# Patient Record
Sex: FEMALE | Race: WHITE | NOT HISPANIC OR LATINO | Employment: UNEMPLOYED | URBAN - METROPOLITAN AREA
[De-identification: names, ages, dates, MRNs, and addresses within clinical notes are randomized per-mention and may not be internally consistent; named-entity substitution may affect disease eponyms.]

---

## 2017-03-17 ENCOUNTER — TRANSFERRED RECORDS (OUTPATIENT)
Dept: HEALTH INFORMATION MANAGEMENT | Facility: CLINIC | Age: 67
End: 2017-03-17

## 2017-04-13 ENCOUNTER — TELEPHONE (OUTPATIENT)
Dept: GASTROENTEROLOGY | Facility: CLINIC | Age: 67
End: 2017-04-13

## 2017-04-13 NOTE — TELEPHONE ENCOUNTER
Left message for pt reminding them of upcoming appointment.  Instructed pt to bring updated medications list.  Instructed pt to arrive an hour and a half to two hours prior to appt time for labs.  Ishmael Reed, CMA

## 2017-04-25 ENCOUNTER — OFFICE VISIT (OUTPATIENT)
Dept: GASTROENTEROLOGY | Facility: CLINIC | Age: 67
End: 2017-04-25
Attending: INTERNAL MEDICINE
Payer: MEDICARE

## 2017-04-25 VITALS
SYSTOLIC BLOOD PRESSURE: 126 MMHG | OXYGEN SATURATION: 99 % | BODY MASS INDEX: 32.5 KG/M2 | HEART RATE: 79 BPM | HEIGHT: 62 IN | DIASTOLIC BLOOD PRESSURE: 79 MMHG | TEMPERATURE: 97.8 F | WEIGHT: 176.6 LBS

## 2017-04-25 DIAGNOSIS — Z23 ENCOUNTER FOR IMMUNIZATION: ICD-10-CM

## 2017-04-25 DIAGNOSIS — K74.60 CIRRHOSIS OF LIVER WITHOUT ASCITES, UNSPECIFIED HEPATIC CIRRHOSIS TYPE (H): ICD-10-CM

## 2017-04-25 DIAGNOSIS — K74.60 CIRRHOSIS OF LIVER WITHOUT ASCITES, UNSPECIFIED HEPATIC CIRRHOSIS TYPE (H): Primary | ICD-10-CM

## 2017-04-25 LAB
AFP SERPL-MCNC: 4.6 UG/L (ref 0–8)
ALBUMIN SERPL-MCNC: 3.8 G/DL (ref 3.4–5)
ALP SERPL-CCNC: 87 U/L (ref 40–150)
ALT SERPL W P-5'-P-CCNC: 61 U/L (ref 0–50)
ANION GAP SERPL CALCULATED.3IONS-SCNC: 4 MMOL/L (ref 3–14)
AST SERPL W P-5'-P-CCNC: 44 U/L (ref 0–45)
BILIRUB DIRECT SERPL-MCNC: 0.2 MG/DL (ref 0–0.2)
BILIRUB SERPL-MCNC: 0.7 MG/DL (ref 0.2–1.3)
BUN SERPL-MCNC: 13 MG/DL (ref 7–30)
CALCIUM SERPL-MCNC: 8.6 MG/DL (ref 8.5–10.1)
CHLORIDE SERPL-SCNC: 107 MMOL/L (ref 94–109)
CO2 SERPL-SCNC: 30 MMOL/L (ref 20–32)
CREAT SERPL-MCNC: 0.62 MG/DL (ref 0.52–1.04)
ERYTHROCYTE [DISTWIDTH] IN BLOOD BY AUTOMATED COUNT: 13.1 % (ref 10–15)
GFR SERPL CREATININE-BSD FRML MDRD: ABNORMAL ML/MIN/1.7M2
GLUCOSE SERPL-MCNC: 139 MG/DL (ref 70–99)
HCT VFR BLD AUTO: 41 % (ref 35–47)
HGB BLD-MCNC: 13.5 G/DL (ref 11.7–15.7)
INR PPP: 1.13 (ref 0.86–1.14)
MCH RBC QN AUTO: 29.9 PG (ref 26.5–33)
MCHC RBC AUTO-ENTMCNC: 32.9 G/DL (ref 31.5–36.5)
MCV RBC AUTO: 91 FL (ref 78–100)
PLATELET # BLD AUTO: 87 10E9/L (ref 150–450)
POTASSIUM SERPL-SCNC: 3.7 MMOL/L (ref 3.4–5.3)
PROT SERPL-MCNC: 6.9 G/DL (ref 6.8–8.8)
RBC # BLD AUTO: 4.52 10E12/L (ref 3.8–5.2)
SODIUM SERPL-SCNC: 141 MMOL/L (ref 133–144)
WBC # BLD AUTO: 2.9 10E9/L (ref 4–11)

## 2017-04-25 PROCEDURE — 85610 PROTHROMBIN TIME: CPT | Performed by: INTERNAL MEDICINE

## 2017-04-25 PROCEDURE — 80048 BASIC METABOLIC PNL TOTAL CA: CPT | Performed by: INTERNAL MEDICINE

## 2017-04-25 PROCEDURE — 85027 COMPLETE CBC AUTOMATED: CPT | Performed by: INTERNAL MEDICINE

## 2017-04-25 PROCEDURE — 36415 COLL VENOUS BLD VENIPUNCTURE: CPT | Performed by: INTERNAL MEDICINE

## 2017-04-25 PROCEDURE — 82105 ALPHA-FETOPROTEIN SERUM: CPT | Performed by: INTERNAL MEDICINE

## 2017-04-25 PROCEDURE — 80076 HEPATIC FUNCTION PANEL: CPT | Performed by: INTERNAL MEDICINE

## 2017-04-25 PROCEDURE — 99212 OFFICE O/P EST SF 10 MIN: CPT | Mod: ZF

## 2017-04-25 PROCEDURE — 90732 PPSV23 VACC 2 YRS+ SUBQ/IM: CPT | Mod: ZF | Performed by: INTERNAL MEDICINE

## 2017-04-25 PROCEDURE — 25000128 H RX IP 250 OP 636: Mod: ZF | Performed by: INTERNAL MEDICINE

## 2017-04-25 PROCEDURE — G0009 ADMIN PNEUMOCOCCAL VACCINE: HCPCS | Mod: ZF

## 2017-04-25 RX ADMIN — PNEUMOCOCCAL VACCINE POLYVALENT 0.5 ML
25; 25; 25; 25; 25; 25; 25; 25; 25; 25; 25; 25; 25; 25; 25; 25; 25; 25; 25; 25; 25; 25; 25 INJECTION, SOLUTION INTRAMUSCULAR; SUBCUTANEOUS at 12:37

## 2017-04-25 ASSESSMENT — PAIN SCALES - GENERAL: PAINLEVEL: NO PAIN (0)

## 2017-04-25 NOTE — NURSING NOTE
Chief Complaint   Patient presents with     RECHECK     Cirrhosis of Liver without Ascites   Pt roomed, vitals, meds, and allergies reviewed with pt. Pt ready for provider.  Ishmael Reed, CMA

## 2017-04-25 NOTE — LETTER
4/25/2017      RE: Felisa Starks  PO   [place] [address]       I had the pleasure of seeing Felisa Starks for followup in the Liver Clinic at the [facility] on 04/25/2017.  Ms. Starks returns for followup of cirrhosis caused by chronic hepatitis C.  She is a sustained virologic responder to hepatitis C treatment.        She is doing well at this visit.  She denies any abdominal pain, itching or skin rash and has only mild fatigue.  She denies any increased abdominal girth.  She did develop some fairly marked swelling in her left leg.  She has been evaluated for that locally, and it sounds as though she has some lymphedema treatment.  A DVT was ruled out, and she mentioned that there may be a small fracture in 1 of the leg bones.  She is no longer on diuretics.  She did have an upper GI endoscopy that showed grade 2 esophageal varices, although they were not banded.  She denies any fevers or chills.  She does have a cough related to a resolving cold.  She denies any nausea or vomiting and is prone to loose bowel movements.  They do not wake her at night.  They are not associated with any blood.  Her appetite has been good; and unfortunately, she has not lost any weight.       Current Outpatient Prescriptions   Medication     Multiple Vitamins-Minerals (MULTIVITAMIN ADULT PO)     Albuterol Sulfate (VENTOLIN HFA IN)     omeprazole (PRILOSEC) 20 MG capsule     budesonide-formoterol (SYMBICORT) 160-4.5 MCG/ACT inhaler     Current Facility-Administered Medications   Medication     pneumococcal vaccine (PNEUMOVAX 23-brown) injection 0.5 mL     B/P: 126/79, T: 97.8, P: 79, R: Data Unavailable    HEENT exam shows no scleral icterus and no temporal muscle wasting.  Chest is clear.  Abdominal exam shows no increase in girth.  No masses or tenderness to palpation are present.  Liver is 10 cm in span without left lobe enlargement.  No spleen tip is palpable, and extremity exam shows no edema.   Skin exam shows no stigmata of chronic liver disease.  Neurologic exam shows no asterixis.       Recent Results (from the past 168 hour(s))    Hepatic Panel [LAB20]    Collection Time: 04/25/17  5:58 AM   Result Value Ref Range    Bilirubin Direct 0.2 0.0 - 0.2 mg/dL    Bilirubin Total 0.7 0.2 - 1.3 mg/dL    Albumin 3.8 3.4 - 5.0 g/dL    Protein Total 6.9 6.8 - 8.8 g/dL    Alkaline Phosphatase 87 40 - 150 U/L    ALT 61 (H) 0 - 50 U/L    AST 44 0 - 45 U/L   Basic metabolic panel [LAB15]    Collection Time: 04/25/17  5:58 AM   Result Value Ref Range    Sodium 141 133 - 144 mmol/L    Potassium 3.7 3.4 - 5.3 mmol/L    Chloride 107 94 - 109 mmol/L    Carbon Dioxide 30 20 - 32 mmol/L    Anion Gap 4 3 - 14 mmol/L    Glucose 139 (H) 70 - 99 mg/dL    Urea Nitrogen 13 7 - 30 mg/dL    Creatinine 0.62 0.52 - 1.04 mg/dL    GFR Estimate >90  Non  GFR Calc   >60 mL/min/1.7m2    GFR Estimate If Black >90   GFR Calc   >60 mL/min/1.7m2    Calcium 8.6 8.5 - 10.1 mg/dL   CBC with platelets [[id number]]    Collection Time: 04/25/17  5:58 AM   Result Value Ref Range    WBC 2.9 (L) 4.0 - 11.0 10e9/L    RBC Count 4.52 3.8 - 5.2 10e12/L    Hemoglobin 13.5 11.7 - 15.7 g/dL    Hematocrit 41.0 35.0 - 47.0 %    MCV 91 78 - 100 fl    MCH 29.9 26.5 - 33.0 pg    MCHC 32.9 31.5 - 36.5 g/dL    RDW 13.1 10.0 - 15.0 %    Platelet Count 87 (L) 150 - 450 10e9/L   INR [[id number]]    Collection Time: 04/25/17  5:58 AM   Result Value Ref Range    INR 1.13 0.86 - 1.14      I did review her ultrasound which shows a cirrhotic-appearing liver without any mass lesions.  She has no ascites.  She has mild splenomegaly.      My impression is that Ms. Starks has cirrhosis caused by chronic hepatitis C.  As I mentioned, she has a sustained virologic responder to hepatitis C treatment, and with that her liver function actually has improved and the manifestations of cirrhosis also have improved.  She is scheduled to have a followup endoscopy  with Dr. Leach.  I would consider banding if she does still have grade 2 varices.  She will get a pneumococcal vaccination today and will then be up-to-date on her vaccines.  My plan will be to see her back in the clinic in 6 months at which time we will repeat the ultrasound and blood work.      Thank you very much for allowing me to participate in the care of this patient.  If you have any questions regarding my recommendations, please do not hesitate to contact me.       Abbe Reynolds MD      Professor of Medicine  [employer]      Executive Medical Director, Solid Organ Transplant Program  [facility]

## 2017-04-25 NOTE — MR AVS SNAPSHOT
After Visit Summary   4/25/2017    Felisa Starks    MRN: [id number]           Patient Information     Date Of Birth          1950        Visit Information        Provider Department      4/25/2017 9:00 AM Abbe Reynolds MD [facility] Hepatology        Today's Diagnoses     Cirrhosis of liver without ascites, unspecified hepatic cirrhosis type (H)    -  1    Encounter for immunization            Follow-ups after your visit        Follow-up notes from your care team     Return in about 6 months (around 10/25/2017).      Your next 10 appointments already scheduled     Apr 25, 2017  9:00 AM CDT   (Arrive by 8:45 AM)   Return General Liver with Abbe Reynolds MD   [facility] Hepatology ([facility])    [address] [place] [id number]   [phone]            Oct 24, 2017  6:45 AM CDT   Lab with  LAB   [facility] Lab ([facility])    [address] [place] [id number] [phone]            Oct 24, 2017  7:00 AM CDT   US ABDOMEN COMPLETE with UCUS[address] [place] [address] Imaging Center US ([facility])    [address] [place] [id number] [phone]           Please bring a list of your medicines (including vitamins, minerals and over-the-counter drugs). Also, tell your doctor about any allergies you may have. Wear comfortable clothes and leave your valuables at home.  Adults: No eating or drinking for 8 hours before the exam. You may take medicine with a small sip of water.  Children: - Children 6+ years: No food or drink for 6 hours before exam. - Children 1-5 years: No food or drink for 4 hours before exam. - Infants, breast-fed: may have breast milk up to 2 hours before exam. - Infants, formula: may have bottle until 4 hours before exam.  Please call the Imaging Department at your exam site with any questions.            Oct 24, 2017  9:00 AM CDT   (Arrive by 8:45  AM)   Return General Liver with Abbe Reynolds MD   [facility] Hepatology ([facility])    [address] [facility]  3rd [facility] [address] [phone]              Future tests that were ordered for you today     Open Standing Orders        Priority Remaining Interval Expires Ordered    US abdomen complete [TUC908] Routine 2/2 Every 6 Months 5/25/2018 4/25/2017          Open Future Orders        Priority Expected Expires Ordered    US Abdomen Complete Routine 7/24/2017 4/25/2018 4/25/2017     Hepatic Panel [LAB20] Routine 10/22/2017 5/25/2018 4/25/2017    Basic metabolic panel [LAB15] Routine 10/22/2017 5/25/2018 4/25/2017    CBC with platelets [[id number]] Routine 10/22/2017 5/25/2018 4/25/2017    INR [[id number]] Routine 10/22/2017 5/25/2018 4/25/2017    AFP tumor marker [LBZ821] Routine 10/22/2017 5/25/2018 4/25/2017            Who to contact     If you have questions or need follow up information about today's clinic visit or your schedule please contact [facility] HEPATOLOGY directly at [phone].  Normal or non-critical lab and imaging results will be communicated to you by MyChart, letter or phone within 4 business days after the clinic has received the results. If you do not hear from us within 7 days, please contact the clinic through [employer] or phone. If you have a critical or abnormal lab result, we will notify you by phone as soon as possible.  Submit refill requests through [employer] or call your pharmacy and they will forward the refill request to us. Please allow 3 business days for your refill to be completed.          Additional Information About Your Visit        [employer] Information     [employer] gives you secure access to your electronic health record. If you see a primary care provider, you can also send messages to your care team and make appointments. If you have questions, please call your primary care clinic.  If you do not have a primary care provider, please call  "[phone] and they will assist you.        Care EveryWhere ID     This is your Care EveryWhere ID. This could be used by other organizations to access your [facility] medical records  [id number]        Your Vitals Were     Pulse Temperature Height Pulse Oximetry BMI (Body Mass Index)       79 97.8  F (36.6  C) (Oral) 1.575 m (5' 2\") 99% 32.3 kg/m2        Blood Pressure from Last 3 Encounters:   04/25/17 126/79   10/25/16 150/88   04/21/16 126/78    Weight from Last 3 Encounters:   04/25/17 80.1 kg (176 lb 9.6 oz)   10/25/16 80.3 kg (177 lb)   04/21/16 77.6 kg (171 lb)              We Performed the Following     Schedule follow up appointments        Primary Care Provider Office Phone # Fax #    Hai Hamm [phone]       [place] [address] [address] [place] [address]        Thank you!     Thank you for choosing [facility]PATOLOG[facility]  for your care. Our goal is always to provide you with excellent care. Hearing back from our patients is one way we can continue to improve our services. Please take a few minutes to complete the written survey that you may receive in the mail after your visit with us. Thank you!             Your Updated Medication List - Protect others around you: Learn how to safely use, store and throw away your medicines at www.disposemymeds.org.          This list is accurate as of: 4/25/17  8:38 AM.  Always use your most recent med list.                   Brand Name Dispense Instructions for use    MULTIVITAMIN ADULT PO      Take 1 capsule by mouth as needed       omeprazole 20 MG CR capsule    priLOSEC     Take by mouth daily       SYMBICORT 160-4.5 MCG/ACT Inhaler   Generic drug:  budesonide-formoterol      Inhale 2 puffs into the lungs 2 times daily       VENTOLIN HFA IN            "

## 2017-04-25 NOTE — PROGRESS NOTES
I had the pleasure of seeing Felisa Starks for followup in the Liver Clinic at the [facility] on 04/25/2017.  Ms. Starks returns for followup of cirrhosis caused by chronic hepatitis C.  She is a sustained virologic responder to hepatitis C treatment.        She is doing well at this visit.  She denies any abdominal pain, itching or skin rash and has only mild fatigue.  She denies any increased abdominal girth.  She did develop some fairly marked swelling in her left leg.  She has been evaluated for that locally, and it sounds as though she has some lymphedema treatment.  A DVT was ruled out, and she mentioned that there may be a small fracture in 1 of the leg bones.  She is no longer on diuretics.  She did have an upper GI endoscopy that showed grade 2 esophageal varices, although they were not banded.  She denies any fevers or chills.  She does have a cough related to a resolving cold.  She denies any nausea or vomiting and is prone to loose bowel movements.  They do not wake her at night.  They are not associated with any blood.  Her appetite has been good; and unfortunately, she has not lost any weight.       Current Outpatient Prescriptions   Medication     Multiple Vitamins-Minerals (MULTIVITAMIN ADULT PO)     Albuterol Sulfate (VENTOLIN HFA IN)     omeprazole (PRILOSEC) 20 MG capsule     budesonide-formoterol (SYMBICORT) 160-4.5 MCG/ACT inhaler     Current Facility-Administered Medications   Medication     pneumococcal vaccine (PNEUMOVAX 23-brown) injection 0.5 mL     B/P: 126/79, T: 97.8, P: 79, R: Data Unavailable    HEENT exam shows no scleral icterus and no temporal muscle wasting.  Chest is clear.  Abdominal exam shows no increase in girth.  No masses or tenderness to palpation are present.  Liver is 10 cm in span without left lobe enlargement.  No spleen tip is palpable, and extremity exam shows no edema.  Skin exam shows no stigmata of chronic liver disease.  Neurologic exam  shows no asterixis.       Recent Results (from the past 168 hour(s))    Hepatic Panel [LAB20]    Collection Time: 04/25/17  5:58 AM   Result Value Ref Range    Bilirubin Direct 0.2 0.0 - 0.2 mg/dL    Bilirubin Total 0.7 0.2 - 1.3 mg/dL    Albumin 3.8 3.4 - 5.0 g/dL    Protein Total 6.9 6.8 - 8.8 g/dL    Alkaline Phosphatase 87 40 - 150 U/L    ALT 61 (H) 0 - 50 U/L    AST 44 0 - 45 U/L   Basic metabolic panel [LAB15]    Collection Time: 04/25/17  5:58 AM   Result Value Ref Range    Sodium 141 133 - 144 mmol/L    Potassium 3.7 3.4 - 5.3 mmol/L    Chloride 107 94 - 109 mmol/L    Carbon Dioxide 30 20 - 32 mmol/L    Anion Gap 4 3 - 14 mmol/L    Glucose 139 (H) 70 - 99 mg/dL    Urea Nitrogen 13 7 - 30 mg/dL    Creatinine 0.62 0.52 - 1.04 mg/dL    GFR Estimate >90  Non  GFR Calc   >60 mL/min/1.7m2    GFR Estimate If Black >90   GFR Calc   >60 mL/min/1.7m2    Calcium 8.6 8.5 - 10.1 mg/dL   CBC with platelets [LTR526]    Collection Time: 04/25/17  5:58 AM   Result Value Ref Range    WBC 2.9 (L) 4.0 - 11.0 10e9/L    RBC Count 4.52 3.8 - 5.2 10e12/L    Hemoglobin 13.5 11.7 - 15.7 g/dL    Hematocrit 41.0 35.0 - 47.0 %    MCV 91 78 - 100 fl    MCH 29.9 26.5 - 33.0 pg    MCHC 32.9 31.5 - 36.5 g/dL    RDW 13.1 10.0 - 15.0 %    Platelet Count 87 (L) 150 - 450 10e9/L   INR [[id number]]    Collection Time: 04/25/17  5:58 AM   Result Value Ref Range    INR 1.13 0.86 - 1.14      I did review her ultrasound which shows a cirrhotic-appearing liver without any mass lesions.  She has no ascites.  She has mild splenomegaly.      My impression is that Ms. Starks has cirrhosis caused by chronic hepatitis C.  As I mentioned, she has a sustained virologic responder to hepatitis C treatment, and with that her liver function actually has improved and the manifestations of cirrhosis also have improved.  She is scheduled to have a followup endoscopy with Dr. Leach.  I would consider banding if she does still have  grade 2 varices.  She will get a pneumococcal vaccination today and will then be up-to-date on her vaccines.  My plan will be to see her back in the clinic in 6 months at which time we will repeat the ultrasound and blood work.      Thank you very much for allowing me to participate in the care of this patient.  If you have any questions regarding my recommendations, please do not hesitate to contact me.       Abbe Reynolds MD      Professor of Medicine  [employer]      Executive Medical Director, Solid Organ Transplant Program  [facility]

## 2017-07-08 ENCOUNTER — HEALTH MAINTENANCE LETTER (OUTPATIENT)
Age: 67
End: 2017-07-08

## 2017-10-31 ENCOUNTER — TELEPHONE (OUTPATIENT)
Dept: GASTROENTEROLOGY | Facility: CLINIC | Age: 67
End: 2017-10-31

## 2017-10-31 NOTE — TELEPHONE ENCOUNTER
Patient contacted and reminded of upcoming appointment.  Patient confirmed they will be attending.  Patient instructed to bring updated medications list to appointmaefl  Instructed pt to arrive an hour and a half to two hours prior to appt time for labs.  Ishmael Reed, CMA

## 2017-11-01 ENCOUNTER — OFFICE VISIT (OUTPATIENT)
Dept: GASTROENTEROLOGY | Facility: CLINIC | Age: 67
End: 2017-11-01
Attending: INTERNAL MEDICINE
Payer: MEDICARE

## 2017-11-01 VITALS
RESPIRATION RATE: 18 BRPM | DIASTOLIC BLOOD PRESSURE: 87 MMHG | BODY MASS INDEX: 31.63 KG/M2 | WEIGHT: 171.9 LBS | SYSTOLIC BLOOD PRESSURE: 129 MMHG | OXYGEN SATURATION: 98 % | HEART RATE: 74 BPM | HEIGHT: 62 IN | TEMPERATURE: 97.7 F

## 2017-11-01 DIAGNOSIS — K74.60 CIRRHOSIS OF LIVER WITHOUT ASCITES, UNSPECIFIED HEPATIC CIRRHOSIS TYPE (H): Primary | ICD-10-CM

## 2017-11-01 DIAGNOSIS — K74.60 CIRRHOSIS OF LIVER WITHOUT ASCITES, UNSPECIFIED HEPATIC CIRRHOSIS TYPE (H): ICD-10-CM

## 2017-11-01 LAB
AFP SERPL-MCNC: 3.7 UG/L (ref 0–8)
ALBUMIN SERPL-MCNC: 3.8 G/DL (ref 3.4–5)
ALP SERPL-CCNC: 84 U/L (ref 40–150)
ALT SERPL W P-5'-P-CCNC: 54 U/L (ref 0–50)
ANION GAP SERPL CALCULATED.3IONS-SCNC: 7 MMOL/L (ref 3–14)
AST SERPL W P-5'-P-CCNC: 44 U/L (ref 0–45)
BILIRUB DIRECT SERPL-MCNC: 0.3 MG/DL (ref 0–0.2)
BILIRUB SERPL-MCNC: 1.4 MG/DL (ref 0.2–1.3)
BUN SERPL-MCNC: 12 MG/DL (ref 7–30)
CALCIUM SERPL-MCNC: 8.5 MG/DL (ref 8.5–10.1)
CHLORIDE SERPL-SCNC: 108 MMOL/L (ref 94–109)
CO2 SERPL-SCNC: 28 MMOL/L (ref 20–32)
CREAT SERPL-MCNC: 0.7 MG/DL (ref 0.52–1.04)
ERYTHROCYTE [DISTWIDTH] IN BLOOD BY AUTOMATED COUNT: 13.1 % (ref 10–15)
GFR SERPL CREATININE-BSD FRML MDRD: 84 ML/MIN/1.7M2
GLUCOSE SERPL-MCNC: 128 MG/DL (ref 70–99)
HCT VFR BLD AUTO: 41.7 % (ref 35–47)
HGB BLD-MCNC: 13.6 G/DL (ref 11.7–15.7)
INR PPP: 1.13 (ref 0.86–1.14)
MCH RBC QN AUTO: 29.2 PG (ref 26.5–33)
MCHC RBC AUTO-ENTMCNC: 32.6 G/DL (ref 31.5–36.5)
MCV RBC AUTO: 90 FL (ref 78–100)
PLATELET # BLD AUTO: 103 10E9/L (ref 150–450)
POTASSIUM SERPL-SCNC: 4 MMOL/L (ref 3.4–5.3)
PROT SERPL-MCNC: 6.9 G/DL (ref 6.8–8.8)
RBC # BLD AUTO: 4.65 10E12/L (ref 3.8–5.2)
SODIUM SERPL-SCNC: 142 MMOL/L (ref 133–144)
WBC # BLD AUTO: 3.1 10E9/L (ref 4–11)

## 2017-11-01 PROCEDURE — 85027 COMPLETE CBC AUTOMATED: CPT | Performed by: INTERNAL MEDICINE

## 2017-11-01 PROCEDURE — 80076 HEPATIC FUNCTION PANEL: CPT | Performed by: INTERNAL MEDICINE

## 2017-11-01 PROCEDURE — 82105 ALPHA-FETOPROTEIN SERUM: CPT | Performed by: INTERNAL MEDICINE

## 2017-11-01 PROCEDURE — 85610 PROTHROMBIN TIME: CPT | Performed by: INTERNAL MEDICINE

## 2017-11-01 PROCEDURE — 80048 BASIC METABOLIC PNL TOTAL CA: CPT | Performed by: INTERNAL MEDICINE

## 2017-11-01 PROCEDURE — 36415 COLL VENOUS BLD VENIPUNCTURE: CPT | Performed by: INTERNAL MEDICINE

## 2017-11-01 PROCEDURE — 99213 OFFICE O/P EST LOW 20 MIN: CPT | Mod: ZF

## 2017-11-01 RX ORDER — FLUTICASONE PROPIONATE 220 UG/1
2 AEROSOL, METERED RESPIRATORY (INHALATION) 2 TIMES DAILY
COMMUNITY

## 2017-11-01 RX ORDER — LOPERAMIDE HYDROCHLORIDE 2 MG/1
2 TABLET ORAL 4 TIMES DAILY PRN
COMMUNITY

## 2017-11-01 ASSESSMENT — PAIN SCALES - GENERAL: PAINLEVEL: NO PAIN (0)

## 2017-11-01 NOTE — MR AVS SNAPSHOT
After Visit Summary   11/1/2017    Felisa Starks    MRN: [id number]           Patient Information     Date Of Birth          1950        Visit Information        Provider Department      11/1/2017 3:00 PM Abbe Reynolds MD [facility] Hepatology        Today's Diagnoses     Cirrhosis of liver without ascites, unspecified hepatic cirrhosis type (H)    -  1       Follow-ups after your visit        Follow-up notes from your care team     Return in about 6 months (around 5/1/2018).      Your next 10 appointments already scheduled     Nov 01, 2017  3:00 PM CDT   (Arrive by 2:45 PM)   Return General Liver with MD FIDEL Monsivais [facility] Hepatology ([facility])    [address] [place] [phone]   [phone]            May 01, 2018  7:00 AM CDT   Lab with  LAB   [facility] Lab ([facility])    [address] [place] [id number]   [phone]            May 01, 2018  7:30 AM CDT   US ABDOMEN COMPLETE with UCUS2   [facility] US ([facility])    [address] [place] [id number] [phone]           Please bring a list of your medicines (including vitamins, minerals and over-the-counter drugs). Also, tell your doctor about any allergies you may have. Wear comfortable clothes and leave your valuables at home.  Adults: No eating or drinking for 8 hours before the exam. You may take medicine with a small sip of water.  Children: - Children 6+ years: No food or drink for 6 hours before exam. - Children 1-5 years: No food or drink for 4 hours before exam. - Infants, breast-fed: may have breast milk up to 2 hours before exam. - Infants, formula: may have bottle until 4 hours before exam.  Please call the Imaging Department at your exam site with any questions.            May 01, 2018  8:30 AM CDT   (Arrive by 8:15 AM)   Return General Liver with Abbe KRAFT  MD Rodolfo   [facility] Hepatology ([facility])    [address] [facility]  3rd [facility] [id number] [phone]              Future tests that were ordered for you today     Open Standing Orders        Priority Remaining Interval Expires Ordered    US abdomen complete [OLX459] Routine 2/2 Every 6 Months 11/1/2018 11/1/2017          Open Future Orders        Priority Expected Expires Ordered    Hepatic Panel [LAB20] Routine 4/30/2018 11/1/2018 11/1/2017    Basic metabolic panel [LAB15] Routine 4/30/2018 11/1/2018 11/1/2017    CBC with platelets [XBF559] Routine 4/30/2018 11/1/2018 11/1/2017    INR [[id number]] Routine 4/30/2018 11/1/2018 11/1/2017    AFP tumor marker [VWW067] Routine 4/30/2018 11/1/2018 11/1/2017            Who to contact     If you have questions or need follow up information about today's clinic visit or your schedule please contact [facility] HEPATOLOGY directly at [phone].  Normal or non-critical lab and imaging results will be communicated to you by [employer], letter or phone within 4 business days after the clinic has received the results. If you do not hear from us within 7 days, please contact the clinic through [employer] or phone. If you have a critical or abnormal lab result, we will notify you by phone as soon as possible.  Submit refill requests through [employer] or call your pharmacy and they will forward the refill request to us. Please allow 3 business days for your refill to be completed.          Additional Information About Your Visit        [employer] Information     [employer] gives you secure access to your electronic health record. If you see a primary care provider, you can also send messages to your care team and make appointments. If you have questions, please call your primary care clinic.  If you do not have a primary care provider, please call [phone] and they will assist you.        Care EveryWhere ID     This is your Care EveryWhere ID. This  "could be used by other organizations to access your [facility] medical records  [id number]        Your Vitals Were     Pulse Temperature Respirations Height Pulse Oximetry BMI (Body Mass Index)    74 97.7  F (36.5  C) (Oral) 18 1.575 m (5' 2.01\") 98% 31.43 kg/m2       Blood Pressure from Last 3 Encounters:   11/01/17 129/87   04/25/17 126/79   10/25/16 150/88    Weight from Last 3 Encounters:   11/01/17 78 kg (171 lb 14.4 oz)   04/25/17 80.1 kg (176 lb 9.6 oz)   10/25/16 80.3 kg (177 lb)              We Performed the Following     Schedule follow up appointments          Today's Medication Changes          These changes are accurate as of: 11/1/17  2:50 PM.  If you have any questions, ask your nurse or doctor.               Stop taking these medicines if you haven't already. Please contact your care team if you have questions.     VENTOLIN HFA IN   Stopped by:  Abbe Reynolds MD                    Primary Care Provider Office Phone # Fax #    aHi Hamm [phone]       [facility] [address] [place] [address]        Equal Access to Services     IVANIA MARK AH: Hadii aad ku hadasho Soomaali, waaxda luqadaha, qaybta kaalmada adeegyada, waxay erikin haytalan ed estrella . So [facility] [phone].    ATENCIÓN: Si habla español, tiene a florentino disposición servicios gratuitos de asistencia lingüística. Llame al [phone].    We comply with applicable federal civil rights laws and [place] laws. We do not discriminate on the basis of race, color, national origin, age, disability, sex, sexual orientation, or gender identity.            Thank you!     Thank you for choosing [facility] HEPATOLOGY  for your care. Our goal is always to provide you with excellent care. Hearing back from our patients is one way we can continue to improve our services. Please take a few minutes to complete the written survey that you may receive in the mail after your visit with us. Thank you!             Your " Updated Medication List - Protect others around you: Learn how to safely use, store and throw away your medicines at www.disposemymeds.org.          This list is accurate as of: 11/1/17  2:50 PM.  Always use your most recent med list.                   Brand Name Dispense Instructions for use Diagnosis    CALCIUM 500 + D3 PO      Take 1 tablet by mouth daily as needed (takes when she feels she needs it.)        FLOVENT  MCG/ACT Inhaler   Generic drug:  fluticasone      Inhale 2 puffs into the lungs 2 times daily        IMODIUM A-D 2 MG tablet   Generic drug:  loperamide      Take 2 mg by mouth 4 times daily as needed for diarrhea        MULTIVITAMIN ADULT PO      Take 1 capsule by mouth as needed        vitamin C 500 MG Chew      Take 1 tablet by mouth as needed (takes when she feels she needs it)

## 2018-05-17 ENCOUNTER — RADIANT APPOINTMENT (OUTPATIENT)
Dept: ULTRASOUND IMAGING | Facility: CLINIC | Age: 68
End: 2018-05-17
Attending: INTERNAL MEDICINE
Payer: MEDICARE

## 2018-05-17 ENCOUNTER — OFFICE VISIT (OUTPATIENT)
Dept: GASTROENTEROLOGY | Facility: CLINIC | Age: 68
End: 2018-05-17
Attending: INTERNAL MEDICINE
Payer: MEDICARE

## 2018-05-17 VITALS
WEIGHT: 168.6 LBS | DIASTOLIC BLOOD PRESSURE: 82 MMHG | HEART RATE: 66 BPM | HEIGHT: 63 IN | OXYGEN SATURATION: 98 % | BODY MASS INDEX: 29.88 KG/M2 | SYSTOLIC BLOOD PRESSURE: 150 MMHG | TEMPERATURE: 97.5 F

## 2018-05-17 DIAGNOSIS — Z23 ENCOUNTER FOR IMMUNIZATION: ICD-10-CM

## 2018-05-17 DIAGNOSIS — K74.60 CIRRHOSIS OF LIVER WITHOUT ASCITES, UNSPECIFIED HEPATIC CIRRHOSIS TYPE (H): ICD-10-CM

## 2018-05-17 DIAGNOSIS — K74.60 CIRRHOSIS OF LIVER WITHOUT ASCITES, UNSPECIFIED HEPATIC CIRRHOSIS TYPE (H): Primary | ICD-10-CM

## 2018-05-17 LAB
AFP SERPL-MCNC: 3 UG/L (ref 0–8)
ALBUMIN SERPL-MCNC: 3.6 G/DL (ref 3.4–5)
ALP SERPL-CCNC: 78 U/L (ref 40–150)
ALT SERPL W P-5'-P-CCNC: 51 U/L (ref 0–50)
ANION GAP SERPL CALCULATED.3IONS-SCNC: 8 MMOL/L (ref 3–14)
AST SERPL W P-5'-P-CCNC: 38 U/L (ref 0–45)
BILIRUB DIRECT SERPL-MCNC: 0.2 MG/DL (ref 0–0.2)
BILIRUB SERPL-MCNC: 0.8 MG/DL (ref 0.2–1.3)
BUN SERPL-MCNC: 15 MG/DL (ref 7–30)
CALCIUM SERPL-MCNC: 8.8 MG/DL (ref 8.5–10.1)
CHLORIDE SERPL-SCNC: 109 MMOL/L (ref 94–109)
CO2 SERPL-SCNC: 25 MMOL/L (ref 20–32)
CREAT SERPL-MCNC: 0.62 MG/DL (ref 0.52–1.04)
ERYTHROCYTE [DISTWIDTH] IN BLOOD BY AUTOMATED COUNT: 13.5 % (ref 10–15)
GFR SERPL CREATININE-BSD FRML MDRD: >90 ML/MIN/1.7M2
GLUCOSE SERPL-MCNC: 128 MG/DL (ref 70–99)
HCT VFR BLD AUTO: 38.6 % (ref 35–47)
HGB BLD-MCNC: 13 G/DL (ref 11.7–15.7)
INR PPP: 1.05 (ref 0.86–1.14)
MCH RBC QN AUTO: 29.3 PG (ref 26.5–33)
MCHC RBC AUTO-ENTMCNC: 33.7 G/DL (ref 31.5–36.5)
MCV RBC AUTO: 87 FL (ref 78–100)
PLATELET # BLD AUTO: 76 10E9/L (ref 150–450)
POTASSIUM SERPL-SCNC: 3.7 MMOL/L (ref 3.4–5.3)
PROT SERPL-MCNC: 6.5 G/DL (ref 6.8–8.8)
RBC # BLD AUTO: 4.43 10E12/L (ref 3.8–5.2)
SODIUM SERPL-SCNC: 142 MMOL/L (ref 133–144)
WBC # BLD AUTO: 2.6 10E9/L (ref 4–11)

## 2018-05-17 PROCEDURE — 85027 COMPLETE CBC AUTOMATED: CPT | Performed by: INTERNAL MEDICINE

## 2018-05-17 PROCEDURE — 82105 ALPHA-FETOPROTEIN SERUM: CPT | Performed by: INTERNAL MEDICINE

## 2018-05-17 PROCEDURE — 80048 BASIC METABOLIC PNL TOTAL CA: CPT | Performed by: INTERNAL MEDICINE

## 2018-05-17 PROCEDURE — 85610 PROTHROMBIN TIME: CPT | Performed by: INTERNAL MEDICINE

## 2018-05-17 PROCEDURE — G0009 ADMIN PNEUMOCOCCAL VACCINE: HCPCS | Mod: ZF

## 2018-05-17 PROCEDURE — 25000128 H RX IP 250 OP 636: Mod: ZF | Performed by: INTERNAL MEDICINE

## 2018-05-17 PROCEDURE — 36415 COLL VENOUS BLD VENIPUNCTURE: CPT | Performed by: INTERNAL MEDICINE

## 2018-05-17 PROCEDURE — G0463 HOSPITAL OUTPT CLINIC VISIT: HCPCS | Mod: 25,ZF

## 2018-05-17 PROCEDURE — 90670 PCV13 VACCINE IM: CPT | Mod: ZF | Performed by: INTERNAL MEDICINE

## 2018-05-17 PROCEDURE — 80076 HEPATIC FUNCTION PANEL: CPT | Performed by: INTERNAL MEDICINE

## 2018-05-17 RX ADMIN — PNEUMOCOCCAL 13-VALENT CONJUGATE VACCINE 0.5 ML: 2.2; 2.2; 2.2; 2.2; 2.2; 4.4; 2.2; 2.2; 2.2; 2.2; 2.2; 2.2; 2.2 INJECTION, SUSPENSION INTRAMUSCULAR at 09:57

## 2018-05-17 ASSESSMENT — PAIN SCALES - GENERAL: PAINLEVEL: NO PAIN (0)

## 2018-05-17 NOTE — NURSING NOTE
Chief Complaint   Patient presents with     RECHECK     Hepatitis C   Pt roomed, vitals, meds, and allergies reviewed with pt. Pt ready for provider.  Ishmael Reed, CMA

## 2018-05-17 NOTE — PROGRESS NOTES
I had the pleasure of seeing Felisa Starks for followup in the Liver Clinic at the [facility] on 05/17/2018.  Ms. Starks returns for followup of cirrhosis caused by chronic hepatitis C.  She is a sustained virologic responder to hepatitis C treatment.      She is doing well at this visit.  She denies any abdominal pain, itching or skin rash or fatigue.  She is working full-time.  She denies any increased abdominal girth or lower extremity edema.  She has not had any gastrointestinal bleeding or any overt signs of hepatic encephalopathy.  She denies any fevers or chills, cough or shortness of breath.  She denies any nausea or vomiting, diarrhea or constipation.  Her appetite has been good, and her weight is down about 8 pounds over the past year.     Current Outpatient Prescriptions   Medication     Calcium Carb-Cholecalciferol (CALCIUM 500 + D3 PO)     fluticasone (FLOVENT HFA) 220 MCG/ACT Inhaler     loperamide (IMODIUM A-D) 2 MG tablet     Multiple Vitamins-Minerals (MULTIVITAMIN ADULT PO)     Ascorbic Acid (VITAMIN C) 500 MG CHEW     Current Facility-Administered Medications   Medication     pneumococcal (PREVNAR 13) injection 0.5 mL     B/P: 150/82, T: 97.5, P: 66, R: Data Unavailable    HEENT exam shows no scleral icterus and no temporal muscle wasting.  Her chest is clear.  Her abdominal exam shows no increase in girth.  No masses or tenderness to palpation are present.  Her liver is 10 cm in span without left lobe enlargement.  No spleen tip is palpable, and extremity exam shows no edema.  Skin exam shows no stigmata of chronic liver disease.  Neurologic exam shows no asterixis.     Recent Results (from the past 168 hour(s))   Hepatic Panel [LAB20]    Collection Time: 05/17/18  5:38 AM   Result Value Ref Range    Bilirubin Direct 0.2 0.0 - 0.2 mg/dL    Bilirubin Total 0.8 0.2 - 1.3 mg/dL    Albumin 3.6 3.4 - 5.0 g/dL    Protein Total 6.5 (L) 6.8 - 8.8 g/dL    Alkaline Phosphatase  78 40 - 150 U/L    ALT 51 (H) 0 - 50 U/L    AST 38 0 - 45 U/L   Basic metabolic panel [LAB15]    Collection Time: 05/17/18  5:38 AM   Result Value Ref Range    Sodium 142 133 - 144 mmol/L    Potassium 3.7 3.4 - 5.3 mmol/L    Chloride 109 94 - 109 mmol/L    Carbon Dioxide 25 20 - 32 mmol/L    Anion Gap 8 3 - 14 mmol/L    Glucose 128 (H) 70 - 99 mg/dL    Urea Nitrogen 15 7 - 30 mg/dL    Creatinine 0.62 0.52 - 1.04 mg/dL    GFR Estimate >90 >60 mL/min/1.7m2    GFR Estimate If Black >90 >60 mL/min/1.7m2    Calcium 8.8 8.5 - 10.1 mg/dL   CBC with platelets [[id number]]    Collection Time: 05/17/18  5:38 AM   Result Value Ref Range    WBC 2.6 (L) 4.0 - 11.0 10e9/L    RBC Count 4.43 3.8 - 5.2 10e12/L    Hemoglobin 13.0 11.7 - 15.7 g/dL    Hematocrit 38.6 35.0 - 47.0 %    MCV 87 78 - 100 fl    MCH 29.3 26.5 - 33.0 pg    MCHC 33.7 31.5 - 36.5 g/dL    RDW 13.5 10.0 - 15.0 %    Platelet Count 76 (L) 150 - 450 10e9/L   INR [[id number]]    Collection Time: 05/17/18  5:38 AM   Result Value Ref Range    INR 1.05 0.86 - 1.14   AFP tumor marker [[id number]]    Collection Time: 05/17/18  5:38 AM   Result Value Ref Range    Alpha Fetoprotein 3.0 0 - 8 ug/L      I did review her ultrasound which shows no mass lesions in the liver.  She does have a cirrhotic configuration.  There are no other abnormalities noted other than that related to portal hypertension.  She has no ascites.      My impression is that Ms. Starks has cirrhosis caused by chronic hepatitis C.  Her disease is extremely well compensated at this point in time.  She just had an upper GI endoscopy locally and was told she does not need to come back for 1 year, so I am assuming it is fine.  She unfortunately left the report in the care. I have encouraged her to continue with her weight loss.  She will get a Prevnar 13 vaccine today and will then be up-to-date on her vaccinations.  She is also up-to-date on her cancer screening.      My plan will be to see her back in the clinic in 6  months.      Thank you very much for allowing me to participate in the care of this patient.  If you have any questions regarding my recommendations, please do not hesitate to contact me.       Abbe Reynolds MD      Professor of Medicine  [facility] [employer]      Executive Medical Director, Solid Organ Transplant Program  [facility]

## 2018-05-17 NOTE — LETTER
5/17/2018      RE: Felisa Starks  PO   [facility] [address]       I had the pleasure of seeing Felisa Starks for followup in the Liver Clinic at the [facility] on 05/17/2018.  Ms. Starks returns for followup of cirrhosis caused by chronic hepatitis C.  She is a sustained virologic responder to hepatitis C treatment.      She is doing well at this visit.  She denies any abdominal pain, itching or skin rash or fatigue.  She is working full-time.  She denies any increased abdominal girth or lower extremity edema.  She has not had any gastrointestinal bleeding or any overt signs of hepatic encephalopathy.  She denies any fevers or chills, cough or shortness of breath.  She denies any nausea or vomiting, diarrhea or constipation.  Her appetite has been good, and her weight is down about 8 pounds over the past year.     Current Outpatient Prescriptions   Medication     Calcium Carb-Cholecalciferol (CALCIUM 500 + D3 PO)     fluticasone (FLOVENT HFA) 220 MCG/ACT Inhaler     loperamide (IMODIUM A-D) 2 MG tablet     Multiple Vitamins-Minerals (MULTIVITAMIN ADULT PO)     Ascorbic Acid (VITAMIN C) 500 MG CHEW     Current Facility-Administered Medications   Medication     pneumococcal (PREVNAR 13) injection 0.5 mL     B/P: 150/82, T: 97.5, P: 66, R: Data Unavailable    HEENT exam shows no scleral icterus and no temporal muscle wasting.  Her chest is clear.  Her abdominal exam shows no increase in girth.  No masses or tenderness to palpation are present.  Her liver is 10 cm in span without left lobe enlargement.  No spleen tip is palpable, and extremity exam shows no edema.  Skin exam shows no stigmata of chronic liver disease.  Neurologic exam shows no asterixis.     Recent Results (from the past 168 hour(s))   Hepatic Panel [LAB20]    Collection Time: 05/17/18  5:38 AM   Result Value Ref Range    Bilirubin Direct 0.2 0.0 - 0.2 mg/dL    Bilirubin Total 0.8 0.2 - 1.3 mg/dL    Albumin 3.6 3.4 - 5.0  g/dL    Protein Total 6.5 (L) 6.8 - 8.8 g/dL    Alkaline Phosphatase 78 40 - 150 U/L    ALT 51 (H) 0 - 50 U/L    AST 38 0 - 45 U/L   Basic metabolic panel [LAB15]    Collection Time: 05/17/18  5:38 AM   Result Value Ref Range    Sodium 142 133 - 144 mmol/L    Potassium 3.7 3.4 - 5.3 mmol/L    Chloride 109 94 - 109 mmol/L    Carbon Dioxide 25 20 - 32 mmol/L    Anion Gap 8 3 - 14 mmol/L    Glucose 128 (H) 70 - 99 mg/dL    Urea Nitrogen 15 7 - 30 mg/dL    Creatinine 0.62 0.52 - 1.04 mg/dL    GFR Estimate >90 >60 mL/min/1.7m2    GFR Estimate If Black >90 >60 mL/min/1.7m2    Calcium 8.8 8.5 - 10.1 mg/dL   CBC with platelets [[id number]]    Collection Time: 05/17/18  5:38 AM   Result Value Ref Range    WBC 2.6 (L) 4.0 - 11.0 10e9/L    RBC Count 4.43 3.8 - 5.2 10e12/L    Hemoglobin 13.0 11.7 - 15.7 g/dL    Hematocrit 38.6 35.0 - 47.0 %    MCV 87 78 - 100 fl    MCH 29.3 26.5 - 33.0 pg    MCHC 33.7 31.5 - 36.5 g/dL    RDW 13.5 10.0 - 15.0 %    Platelet Count 76 (L) 150 - 450 10e9/L   INR [[id number]]    Collection Time: 05/17/18  5:38 AM   Result Value Ref Range    INR 1.05 0.86 - 1.14   AFP tumor marker [[id number]]    Collection Time: 05/17/18  5:38 AM   Result Value Ref Range    Alpha Fetoprotein 3.0 0 - 8 ug/L      I did review her ultrasound which shows no mass lesions in the liver.  She does have a cirrhotic configuration.  There are no other abnormalities noted other than that related to portal hypertension.  She has no ascites.      My impression is that Ms. Starks has cirrhosis caused by chronic hepatitis C.  Her disease is extremely well compensated at this point in time.  She just had an upper GI endoscopy locally and was told she does not need to come back for 1 year, so I am assuming it is fine.  She unfortunately left the report in the care. I have encouraged her to continue with her weight loss.  She will get a Prevnar 13 vaccine today and will then be up-to-date on her vaccinations.  She is also up-to-date on her  cancer screening.      My plan will be to see her back in the clinic in 6 months.      Thank you very much for allowing me to participate in the care of this patient.  If you have any questions regarding my recommendations, please do not hesitate to contact me.       Abbe Reynolds MD      Professor of Medicine  [facility] [employer]      Executive Medical Director, Solid Organ Transplant Program  [facility]    Abbe Reynolds MD

## 2018-05-17 NOTE — MR AVS SNAPSHOT
After Visit Summary   5/17/2018    Felisa Starks    MRN: [id number]           Patient Information     Date Of Birth          1950        Visit Information        Provider Department      5/17/2018 10:00 AM Abbe Reynolds MD [facility] Hepatology        Today's Diagnoses     Cirrhosis of liver without ascites, unspecified hepatic cirrhosis type (H)    -  1    Encounter for immunization            Follow-ups after your visit        Follow-up notes from your care team     Return in about 6 months (around 11/17/2018).      Your next 10 appointments already scheduled     Nov 15, 2018  6:30 AM CST   Lab with  LAB   [facility] Lab ([facility])    [address]  [address] [facility] [address] [phone]            Nov 15, 2018  7:00 AM CST   US ABDOMEN COMPLETE with US3   [facility] US ([facility])    [address] [place] [address] [phone]           Please bring a list of your medicines (including vitamins, minerals and over-the-counter drugs). Also, tell your doctor about any allergies you may have. Wear comfortable clothes and leave your valuables at home.  Adults: No eating or drinking for 8 hours before the exam. You may take medicine with a small sip of water.  Children: - Children 6+ years: No food or drink for 6 hours before exam. - Children 1-5 years: No food or drink for 4 hours before exam. - Infants, breast-fed: may have breast milk up to 2 hours before exam. - Infants, formula: may have bottle until 4 hours before exam.  Please call the Imaging Department at your exam site with any questions.            Nov 15, 2018  8:15 AM CST   (Arrive by 8:00 AM)   Return General Liver with Abbe Reynolds MD   [facility] Hepatology ([facility])    [address]  [address]  [place] [address] [phone]              Future tests that were ordered for you  today     Open Standing Orders        Priority Remaining Interval Expires Ordered    US abdomen complete [[id number]] Routine 2/2 Every 6 Months 5/17/2019 5/17/2018          Open Future Orders        Priority Expected Expires Ordered    US Abdomen Complete Routine  5/17/2019 5/17/2018    Hepatic Panel [LAB20] Routine 11/13/2018 5/17/2019 5/17/2018    Basic metabolic panel [LAB15] Routine 11/13/2018 5/17/2019 5/17/2018    CBC with platelets [LNT443] Routine 11/13/2018 5/17/2019 5/17/2018    INR [[id number]] Routine 11/13/2018 5/17/2019 5/17/2018    AFP tumor marker [[id number]] Routine 11/13/2018 5/17/2019 5/17/2018            Who to contact     If you have questions or need follow up information about today's clinic visit or your schedule please contact [facility] HEPATOLOG[facility] directly at [phone].  Normal or non-critical lab and imaging results will be communicated to you by [employer], letter or phone within 4 business days after the clinic has received the results. If you do not hear from us within 7 days, please contact the clinic through [employer] or phone. If you have a critical or abnormal lab result, we will notify you by phone as soon as possible.  Submit refill requests through [employer] or call your pharmacy and they will forward the refill request to us. Please allow 3 business days for your refill to be completed.          Additional Information About Your Visit        [employer] Information     [employer] gives you secure access to your electronic health record. If you see a primary care provider, you can also send messages to your care team and make appointments. If you have questions, please call your primary care clinic.  If you do not have a primary care provider, please call [phone] and they will assist you.        Care EveryWhere ID     This is your Care EveryWhere ID. This could be used by other organizations to access your [place] medical records  [id number]        Your Vitals Were     Pulse Temperature Height  "Pulse Oximetry BMI (Body Mass Index)       66 97.5  F (36.4  C) (Oral) 1.6 m (5' 3\") 98% 29.87 kg/m2        Blood Pressure from Last 3 Encounters:   05/17/18 150/82   11/01/17 129/87   04/25/17 126/79    Weight from Last 3 Encounters:   05/17/18 76.5 kg (168 lb 9.6 oz)   11/01/17 78 kg (171 lb 14.4 oz)   04/25/17 80.1 kg (176 lb 9.6 oz)              We Performed the Following     Schedule follow up appointments        Primary Care Provider Office Phone # Fax #    Hai Hamm [phone]       [facility] [address] [address]        Equal Access to Services     [facility] : Hadii aad ku hadasho Sozhao, waaxda luqadaha, qaybta kaalmada adeegyademetria, ann-marie estrella . So [facility] [phone].    ATENCIÓN: Si habla español, tiene a florentino disposición servicios gratuitos de asistencia lingüística. Ann al [phone].    We comply with applicable federal civil rights laws and [place] laws. We do not discriminate on the basis of race, color, national origin, age, disability, sex, sexual orientation, or gender identity.            Thank you!     Thank you for choosing [facility] HEPATOLOGY  for your care. Our goal is always to provide you with excellent care. Hearing back from our patients is one way we can continue to improve our services. Please take a few minutes to complete the written survey that you may receive in the mail after your visit with us. Thank you!             Your Updated Medication List - Protect others around you: Learn how to safely use, store and throw away your medicines at www.disposemymeds.org.          This list is accurate as of 5/17/18 10:01 AM.  Always use your most recent med list.                   Brand Name Dispense Instructions for use Diagnosis    CALCIUM 500 + D3 PO      Take 1 tablet by mouth daily as needed (takes when she feels she needs it.)        FLOVENT  MCG/ACT Inhaler   Generic drug:  fluticasone      Inhale " 2 puffs into the lungs 2 times daily        IMODIUM A-D 2 MG tablet   Generic drug:  loperamide      Take 2 mg by mouth 4 times daily as needed for diarrhea        MULTIVITAMIN ADULT PO      Take 1 capsule by mouth as needed        vitamin C 500 MG Chew      Take 1 tablet by mouth as needed (takes when she feels she needs it)

## 2018-11-13 ENCOUNTER — PATIENT OUTREACH (OUTPATIENT)
Dept: CARE COORDINATION | Facility: CLINIC | Age: 68
End: 2018-11-13

## 2018-11-15 ENCOUNTER — OFFICE VISIT (OUTPATIENT)
Dept: GASTROENTEROLOGY | Facility: CLINIC | Age: 68
End: 2018-11-15
Attending: INTERNAL MEDICINE
Payer: MEDICARE

## 2018-11-15 ENCOUNTER — RADIANT APPOINTMENT (OUTPATIENT)
Dept: ULTRASOUND IMAGING | Facility: CLINIC | Age: 68
End: 2018-11-15
Attending: INTERNAL MEDICINE
Payer: MEDICARE

## 2018-11-15 VITALS
DIASTOLIC BLOOD PRESSURE: 81 MMHG | TEMPERATURE: 98.1 F | HEART RATE: 70 BPM | BODY MASS INDEX: 28.7 KG/M2 | OXYGEN SATURATION: 96 % | WEIGHT: 162 LBS | SYSTOLIC BLOOD PRESSURE: 134 MMHG

## 2018-11-15 DIAGNOSIS — K74.60 CIRRHOSIS OF LIVER WITHOUT ASCITES, UNSPECIFIED HEPATIC CIRRHOSIS TYPE (H): ICD-10-CM

## 2018-11-15 DIAGNOSIS — K74.60 CIRRHOSIS OF LIVER WITHOUT ASCITES, UNSPECIFIED HEPATIC CIRRHOSIS TYPE (H): Primary | ICD-10-CM

## 2018-11-15 PROBLEM — K58.9 IBS (IRRITABLE BOWEL SYNDROME): Status: ACTIVE | Noted: 2017-02-10

## 2018-11-15 PROBLEM — Z87.891 EX-SMOKER: Status: ACTIVE | Noted: 2017-03-01

## 2018-11-15 PROBLEM — I85.00 ESOPHAGEAL VARICES WITHOUT BLEEDING (H): Status: ACTIVE | Noted: 2018-11-15

## 2018-11-15 PROBLEM — Z98.890 HISTORY OF CARPAL TUNNEL RELEASE: Status: ACTIVE | Noted: 2018-11-15

## 2018-11-15 PROBLEM — Z90.710 HX OF HYSTERECTOMY: Status: ACTIVE | Noted: 2018-11-15

## 2018-11-15 PROBLEM — D61.818 ACQUIRED PANCYTOPENIA (H): Status: ACTIVE | Noted: 2017-02-10

## 2018-11-15 PROBLEM — J44.9 COPD (CHRONIC OBSTRUCTIVE PULMONARY DISEASE) (H): Status: ACTIVE | Noted: 2017-03-01

## 2018-11-15 PROBLEM — M72.2 PLANTAR FASCIITIS: Status: ACTIVE | Noted: 2018-11-15

## 2018-11-15 PROBLEM — E11.9 TYPE 2 DIABETES MELLITUS (H): Status: ACTIVE | Noted: 2017-02-10

## 2018-11-15 LAB
AFP SERPL-MCNC: 3.8 UG/L (ref 0–8)
ALBUMIN SERPL-MCNC: 3.5 G/DL (ref 3.4–5)
ALP SERPL-CCNC: 83 U/L (ref 40–150)
ALT SERPL W P-5'-P-CCNC: 56 U/L (ref 0–50)
ANION GAP SERPL CALCULATED.3IONS-SCNC: 6 MMOL/L (ref 3–14)
AST SERPL W P-5'-P-CCNC: 41 U/L (ref 0–45)
BILIRUB DIRECT SERPL-MCNC: 0.3 MG/DL (ref 0–0.2)
BILIRUB SERPL-MCNC: 1.2 MG/DL (ref 0.2–1.3)
BUN SERPL-MCNC: 13 MG/DL (ref 7–30)
CALCIUM SERPL-MCNC: 8.4 MG/DL (ref 8.5–10.1)
CHLORIDE SERPL-SCNC: 108 MMOL/L (ref 94–109)
CO2 SERPL-SCNC: 28 MMOL/L (ref 20–32)
CREAT SERPL-MCNC: 0.76 MG/DL (ref 0.52–1.04)
ERYTHROCYTE [DISTWIDTH] IN BLOOD BY AUTOMATED COUNT: 13.2 % (ref 10–15)
GFR SERPL CREATININE-BSD FRML MDRD: 76 ML/MIN/1.7M2
GLUCOSE SERPL-MCNC: 121 MG/DL (ref 70–99)
HCT VFR BLD AUTO: 41.1 % (ref 35–47)
HGB BLD-MCNC: 13.5 G/DL (ref 11.7–15.7)
INR PPP: 1.07 (ref 0.86–1.14)
MCH RBC QN AUTO: 29.5 PG (ref 26.5–33)
MCHC RBC AUTO-ENTMCNC: 32.8 G/DL (ref 31.5–36.5)
MCV RBC AUTO: 90 FL (ref 78–100)
PLATELET # BLD AUTO: 85 10E9/L (ref 150–450)
POTASSIUM SERPL-SCNC: 3.8 MMOL/L (ref 3.4–5.3)
PROT SERPL-MCNC: 6.6 G/DL (ref 6.8–8.8)
RBC # BLD AUTO: 4.58 10E12/L (ref 3.8–5.2)
SODIUM SERPL-SCNC: 143 MMOL/L (ref 133–144)
WBC # BLD AUTO: 2.6 10E9/L (ref 4–11)

## 2018-11-15 PROCEDURE — 85027 COMPLETE CBC AUTOMATED: CPT | Performed by: INTERNAL MEDICINE

## 2018-11-15 PROCEDURE — 82105 ALPHA-FETOPROTEIN SERUM: CPT | Performed by: INTERNAL MEDICINE

## 2018-11-15 PROCEDURE — G0463 HOSPITAL OUTPT CLINIC VISIT: HCPCS | Mod: ZF

## 2018-11-15 PROCEDURE — 36415 COLL VENOUS BLD VENIPUNCTURE: CPT | Performed by: INTERNAL MEDICINE

## 2018-11-15 PROCEDURE — 85610 PROTHROMBIN TIME: CPT | Performed by: INTERNAL MEDICINE

## 2018-11-15 PROCEDURE — 80048 BASIC METABOLIC PNL TOTAL CA: CPT | Performed by: INTERNAL MEDICINE

## 2018-11-15 PROCEDURE — 80076 HEPATIC FUNCTION PANEL: CPT | Performed by: INTERNAL MEDICINE

## 2018-11-15 RX ORDER — BUDESONIDE AND FORMOTEROL FUMARATE DIHYDRATE 160; 4.5 UG/1; UG/1
AEROSOL RESPIRATORY (INHALATION)
COMMUNITY

## 2018-11-15 ASSESSMENT — PAIN SCALES - GENERAL: PAINLEVEL: MILD PAIN (3)

## 2018-11-15 NOTE — PROGRESS NOTES
I had the pleasure of seeing Felisa Starks for followup in the Liver Clinic at the [facility] on 11/15/2018.  Ms. Starks returns for followup of cirrhosis caused by chronic hepatitis C.  She is a sustained virologic responder to hepatitis C treatment.      She feels fairly well at this visit.  She denies any abdominal pain, itching or skin rash.  She has mild fatigue.  She is working full-time.  She denies any increased abdominal girth or lower extremity edema.  She has not had any gastrointestinal bleeding or any overt signs of hepatic encephalopathy.      She denies any fevers or chills, cough or shortness of breath.  She denies any nausea or vomiting, diarrhea or constipation.  Her appetite has been good, and she has continued to lose weight intentionally.     Current Outpatient Prescriptions   Medication     budesonide-formoterol (SYMBICORT) 160-4.5 MCG/ACT Inhaler     fluticasone (FLOVENT HFA) 220 MCG/ACT Inhaler     Multiple Vitamins-Minerals (MULTIVITAMIN ADULT PO)     Ascorbic Acid (VITAMIN C) 500 MG CHEW     Calcium Carb-Cholecalciferol (CALCIUM 500 + D3 PO)     loperamide (IMODIUM A-D) 2 MG tablet     No current facility-administered medications for this visit.      B/P: 134/81, T: 98.1, P: 70, R: Data Unavailable    In general, she looks quite well.  HEENT exam shows no scleral icterus or temporal muscle wasting.  Her chest is clear.  Her abdominal exam shows no increase in girth.  No masses or tenderness to palpation are present.  Her liver is 10 cm in span without left lobe enlargement.  No spleen tip is palpable.  Extremity exam shows no edema.  Skin exam shows no stigmata of chronic liver disease.  Neurologic exam shows no asterixis.     Recent Results (from the past 168 hour(s))   Hepatic Panel [LAB20]    Collection Time: 11/15/18  5:54 AM   Result Value Ref Range    Bilirubin Direct 0.3 (H) 0.0 - 0.2 mg/dL    Bilirubin Total 1.2 0.2 - 1.3 mg/dL    Albumin 3.5 3.4 - 5.0  g/dL    Protein Total 6.6 (L) 6.8 - 8.8 g/dL    Alkaline Phosphatase 83 40 - 150 U/L    ALT 56 (H) 0 - 50 U/L    AST 41 0 - 45 U/L   Basic metabolic panel [LAB15]    Collection Time: 11/15/18  5:54 AM   Result Value Ref Range    Sodium 143 133 - 144 mmol/L    Potassium 3.8 3.4 - 5.3 mmol/L    Chloride 108 94 - 109 mmol/L    Carbon Dioxide 28 20 - 32 mmol/L    Anion Gap 6 3 - 14 mmol/L    Glucose 121 (H) 70 - 99 mg/dL    Urea Nitrogen 13 7 - 30 mg/dL    Creatinine 0.76 0.52 - 1.04 mg/dL    GFR Estimate 76 >60 mL/min/1.7m2    GFR Estimate If Black >90 >60 mL/min/1.7m2    Calcium 8.4 (L) 8.5 - 10.1 mg/dL   CBC with platelets [[id number]]    Collection Time: 11/15/18  5:54 AM   Result Value Ref Range    WBC 2.6 (L) 4.0 - 11.0 10e9/L    RBC Count 4.58 3.8 - 5.2 10e12/L    Hemoglobin 13.5 11.7 - 15.7 g/dL    Hematocrit 41.1 35.0 - 47.0 %    MCV 90 78 - 100 fl    MCH 29.5 26.5 - 33.0 pg    MCHC 32.8 31.5 - 36.5 g/dL    RDW 13.2 10.0 - 15.0 %    Platelet Count 85 (L) 150 - 450 10e9/L   INR [[id number]]    Collection Time: 11/15/18  5:54 AM   Result Value Ref Range    INR 1.07 0.86 - 1.14   AFP tumor marker [[id number]]    Collection Time: 11/15/18  5:54 AM   Result Value Ref Range    Alpha Fetoprotein 3.8 0 - 8 ug/L      I did review her ultrasound which shows no mass lesions in the liver.  There is no ascites as well.  She does have gallstones and stable splenomegaly.      My impression is that Ms. Starks has cirrhosis caused by chronic hepatitis C.  Her liver function is excellent at this visit, and all complications are for the most part well addressed.  She is due for an upper GI endoscopy to screen for esophageal varices, and she will get that done locally.  She is up-to-date with regard to vaccines, and the last bone density was good.  She is probably good for 2 years on that.  She is up-to-date with regard to cancer screening, and my plan will be to see her back in the clinic in 6 months for repeat ultrasound and blood work.       Thank you very much for allowing me to participate in the care of this patient.  If you have any questions regarding my recommendations, please do not hesitate to contact me.       Abbe Reynolds MD      Professor of Medicine  [facility] [employer]      Executive Medical Director, Solid Organ Transplant Program  [facility]

## 2018-11-15 NOTE — LETTER
11/15/2018      RE: Felisa Starks  [address]  [facility] [address]       I had the pleasure of seeing Felisa Starks for followup in the Liver Clinic at the [facility] on 11/15/2018.  Ms. Starks returns for followup of cirrhosis caused by chronic hepatitis C.  She is a sustained virologic responder to hepatitis C treatment.      She feels fairly well at this visit.  She denies any abdominal pain, itching or skin rash.  She has mild fatigue.  She is working full-time.  She denies any increased abdominal girth or lower extremity edema.  She has not had any gastrointestinal bleeding or any overt signs of hepatic encephalopathy.      She denies any fevers or chills, cough or shortness of breath.  She denies any nausea or vomiting, diarrhea or constipation.  Her appetite has been good, and she has continued to lose weight intentionally.     Current Outpatient Prescriptions   Medication     budesonide-formoterol (SYMBICORT) 160-4.5 MCG/ACT Inhaler     fluticasone (FLOVENT HFA) 220 MCG/ACT Inhaler     Multiple Vitamins-Minerals (MULTIVITAMIN ADULT PO)     Ascorbic Acid (VITAMIN C) 500 MG CHEW     Calcium Carb-Cholecalciferol (CALCIUM 500 + D3 PO)     loperamide (IMODIUM A-D) 2 MG tablet     No current facility-administered medications for this visit.      B/P: 134/81, T: 98.1, P: 70, R: Data Unavailable    In general, she looks quite well.  HEENT exam shows no scleral icterus or temporal muscle wasting.  Her chest is clear.  Her abdominal exam shows no increase in girth.  No masses or tenderness to palpation are present.  Her liver is 10 cm in span without left lobe enlargement.  No spleen tip is palpable.  Extremity exam shows no edema.  Skin exam shows no stigmata of chronic liver disease.  Neurologic exam shows no asterixis.     Recent Results (from the past 168 hour(s))   Hepatic Panel [LAB20]    Collection Time: 11/15/18  5:54 AM   Result Value Ref Range    Bilirubin Direct 0.3 (H) 0.0 - 0.2  mg/dL    Bilirubin Total 1.2 0.2 - 1.3 mg/dL    Albumin 3.5 3.4 - 5.0 g/dL    Protein Total 6.6 (L) 6.8 - 8.8 g/dL    Alkaline Phosphatase 83 40 - 150 U/L    ALT 56 (H) 0 - 50 U/L    AST 41 0 - 45 U/L   Basic metabolic panel [LAB15]    Collection Time: 11/15/18  5:54 AM   Result Value Ref Range    Sodium 143 133 - 144 mmol/L    Potassium 3.8 3.4 - 5.3 mmol/L    Chloride 108 94 - 109 mmol/L    Carbon Dioxide 28 20 - 32 mmol/L    Anion Gap 6 3 - 14 mmol/L    Glucose 121 (H) 70 - 99 mg/dL    Urea Nitrogen 13 7 - 30 mg/dL    Creatinine 0.76 0.52 - 1.04 mg/dL    GFR Estimate 76 >60 mL/min/1.7m2    GFR Estimate If Black >90 >60 mL/min/1.7m2    Calcium 8.4 (L) 8.5 - 10.1 mg/dL   CBC with platelets [[id number]]    Collection Time: 11/15/18  5:54 AM   Result Value Ref Range    WBC 2.6 (L) 4.0 - 11.0 10e9/L    RBC Count 4.58 3.8 - 5.2 10e12/L    Hemoglobin 13.5 11.7 - 15.7 g/dL    Hematocrit 41.1 35.0 - 47.0 %    MCV 90 78 - 100 fl    MCH 29.5 26.5 - 33.0 pg    MCHC 32.8 31.5 - 36.5 g/dL    RDW 13.2 10.0 - 15.0 %    Platelet Count 85 (L) 150 - 450 10e9/L   INR [[id number]]    Collection Time: 11/15/18  5:54 AM   Result Value Ref Range    INR 1.07 0.86 - 1.14   AFP tumor marker [[id number]]    Collection Time: 11/15/18  5:54 AM   Result Value Ref Range    Alpha Fetoprotein 3.8 0 - 8 ug/L      I did review her ultrasound which shows no mass lesions in the liver.  There is no ascites as well.  She does have gallstones and stable splenomegaly.      My impression is that Ms. Starks has cirrhosis caused by chronic hepatitis C.  Her liver function is excellent at this visit, and all complications are for the most part well addressed.  She is due for an upper GI endoscopy to screen for esophageal varices, and she will get that done locally.  She is up-to-date with regard to vaccines, and the last bone density was good.  She is probably good for 2 years on that.  She is up-to-date with regard to cancer screening, and my plan will be to see her  back in the clinic in 6 months for repeat ultrasound and blood work.      Thank you very much for allowing me to participate in the care of this patient.  If you have any questions regarding my recommendations, please do not hesitate to contact me.       Abbe Reynolds MD      Professor of Medicine  [facility] [employer]      Executive Medical Director, Solid Organ Transplant Program  [facility]    Abbe Reynolds MD

## 2018-11-15 NOTE — NURSING NOTE
Chief Complaint   Patient presents with     RECHECK     6 month follow up Hep C     /81 (BP Location: Right arm)  Pulse 70  Temp 98.1  F (36.7  C) (Oral)  Wt 73.5 kg (162 lb)  SpO2 96%  BMI 28.7 kg/m2   Flor Espinosa

## 2018-11-15 NOTE — MR AVS SNAPSHOT
After Visit Summary   11/15/2018    Felisa Starks    MRN: [id number]           Patient Information     Date Of Birth          1950        Visit Information        Provider Department      11/15/2018 8:15 AM Abbe Reynolds MD [facility] Hepatology        Today's Diagnoses     Cirrhosis of liver without ascites, unspecified hepatic cirrhosis type (H)    -  1       Follow-ups after your visit        Follow-up notes from your care team     Return in about 6 months (around 5/15/2019).      Your next 10 appointments already scheduled     May 21, 2019  7:00 AM CDT   Lab with  LAB   [facility] ([facility])    [address] [place] [id number] [phone]            May 21, 2019  7:30 AM CDT   US ABDOMEN COMPLETE with US2   [facility] US ([facility])    [address] [place] [id number] [phone]           How do I prepare for my exam? (Food and drink instructions) Adults: No eating, smoking, gum chewing or drinking for 8 hours before the exam. You may take medicine with a small sip of water.  Children: * Infants, breast-fed: may have breast milk up to 2 hours before exam. * Infants, formula: may have bottle until 4 hours before exam. * Children 1-5 years: No food or drink for 4 hours before exam. * Children 6 -12 years: No food or drink for 6 hours before exam. * Children over 12 years: No food or drink for 8 hours before exam.  * J Tube Fed: No need to stop feedings.  What should I wear: Wear comfortable clothes.  How long does the exam take: Most ultrasounds take 30 to 60 minutes.  What should I bring: Bring a list of your medicines, including vitamins, minerals and over-the-counter drugs. It is safest to leave personal items at home.  Do I need a :  No  is needed.  What do I need to tell my doctor: Tell your doctor about any allergies you may have.  What should I do  after the exam: No restrictions, You may resume normal activities.  What is this test: An ultrasound uses sound waves to make pictures of the body. Sound waves do not cause pain. The only discomfort may be the pressure of the wand against your skin or full bladder.  Who should I call with questions: If you have any questions, please call the Imaging Department where you will have your exam. Directions, parking instructions, and other information is available on our website, [email or web address].            May 21, 2019  8:30 AM CDT   (Arrive by 8:15 AM)   Return General Liver with Abbe Reynolds MD   [facility] Hepat[facility] ([facility])    [address]  [address] [place] [id number] [phone]              Future tests that were ordered for you today     Open Standing Orders        Priority Remaining Interval Expires Ordered    US abdomen complete [RUK219] Routine 2/2 Every 6 Months 11/15/2019 11/15/2018          Open Future Orders        Priority Expected Expires Ordered    US Abdomen Complete Routine  11/15/2019 11/15/2018    Hepatic Panel [LAB20] Routine 5/14/2019 11/15/2019 11/15/2018    Basic metabolic panel [LAB15] Routine 5/14/2019 11/15/2019 11/15/2018    CBC with platelets [RCV311] Routine 5/14/2019 11/15/2019 11/15/2018    INR [DRC0332] Routine 5/14/2019 11/15/2019 11/15/2018    AFP tumor marker [VKB966] Routine 5/14/2019 11/15/2019 11/15/2018            Who to contact     If you have questions or need follow up information about today's clinic visit or your schedule please contact [facility] HEPATOLOG[facility] directly at [phone].  Normal or non-critical lab and imaging results will be communicated to you by MyChart, letter or phone within 4 business days after the clinic has received the results. If you do not hear from us within 7 days, please contact the clinic through MyChart or phone. If you have a critical or abnormal lab result, we will notify you by phone as soon  as possible.  Submit refill requests through [employer] or call your pharmacy and they will forward the refill request to us. Please allow 3 business days for your refill to be completed.          Additional Information About Your Visit        [employer] Information     [employer] gives you secure access to your electronic health record. If you see a primary care provider, you can also send messages to your care team and make appointments. If you have questions, please call your primary care clinic.  If you do not have a primary care provider, please call [phone] and they will assist you.        Care EveryWhere ID     This is your Care EveryWhere ID. This could be used by other organizations to access your [facility] medical records  [id number]        Your Vitals Were     Pulse Temperature Pulse Oximetry BMI (Body Mass Index)          70 98.1  F (36.7  C) (Oral) 96% 28.7 kg/m2         Blood Pressure from Last 3 Encounters:   11/15/18 134/81   05/17/18 150/82   11/01/17 129/87    Weight from Last 3 Encounters:   11/15/18 73.5 kg (162 lb)   05/17/18 76.5 kg (168 lb 9.6 oz)   11/01/17 78 kg (171 lb 14.4 oz)              We Performed the Following     Schedule follow up appointments        Primary Care Provider Office Phone # Fax #    Hai Hamm [phone]       [facility] [address] [address]        Equal Access to Services     [facility]: Hadii aad ku hadasho Soomaali, waaxda luqadaha, qaybta kaalmada adeegyada, ann-marie wiseman hayjimena estrella . So [facility] [phone].    ATENCIÓN: Si habla español, tiene a florentino disposición servicios gratuitos de asistencia lingüística. Llame al [phone].    We comply with applicable federal civil rights laws and [place] laws. We do not discriminate on the basis of race, color, national origin, age, disability, sex, sexual orientation, or gender identity.            Thank you!     Thank you for choosing [facility] HEPATOLOGY  for  your care. Our goal is always to provide you with excellent care. Hearing back from our patients is one way we can continue to improve our services. Please take a few minutes to complete the written survey that you may receive in the mail after your visit with us. Thank you!             Your Updated Medication List - Protect others around you: Learn how to safely use, store and throw away your medicines at www.disposemymeds.org.          This list is accurate as of 11/15/18  8:33 AM.  Always use your most recent med list.                   Brand Name Dispense Instructions for use Diagnosis    CALCIUM 500 + D3 PO      Take 1 tablet by mouth daily as needed (takes when she feels she needs it.)        FLOVENT  MCG/ACT Inhaler   Generic drug:  fluticasone      Inhale 2 puffs into the lungs 2 times daily        IMODIUM A-D 2 MG tablet   Generic drug:  loperamide      Take 2 mg by mouth 4 times daily as needed for diarrhea        MULTIVITAMIN ADULT PO      Take 1 capsule by mouth as needed        SYMBICORT 160-4.5 MCG/ACT Inhaler   Generic drug:  budesonide-formoterol      Symbicort 160 mcg-4.5 mcg/actuation HFA aerosol inhaler        vitamin C 500 MG Chew      Take 1 tablet by mouth as needed (takes when she feels she needs it)
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
## 2019-05-17 ENCOUNTER — TRANSFERRED RECORDS (OUTPATIENT)
Dept: HEALTH INFORMATION MANAGEMENT | Facility: CLINIC | Age: 69
End: 2019-05-17

## 2019-05-20 ENCOUNTER — TRANSFERRED RECORDS (OUTPATIENT)
Dept: HEALTH INFORMATION MANAGEMENT | Facility: CLINIC | Age: 69
End: 2019-05-20

## 2019-05-21 ENCOUNTER — OFFICE VISIT (OUTPATIENT)
Dept: GASTROENTEROLOGY | Facility: CLINIC | Age: 69
End: 2019-05-21
Attending: INTERNAL MEDICINE
Payer: MEDICARE

## 2019-05-21 ENCOUNTER — TRANSFERRED RECORDS (OUTPATIENT)
Dept: HEALTH INFORMATION MANAGEMENT | Facility: CLINIC | Age: 69
End: 2019-05-21

## 2019-05-21 ENCOUNTER — ANCILLARY PROCEDURE (OUTPATIENT)
Dept: ULTRASOUND IMAGING | Facility: CLINIC | Age: 69
End: 2019-05-21
Attending: INTERNAL MEDICINE
Payer: MEDICARE

## 2019-05-21 VITALS
DIASTOLIC BLOOD PRESSURE: 82 MMHG | SYSTOLIC BLOOD PRESSURE: 131 MMHG | HEIGHT: 63 IN | TEMPERATURE: 97.6 F | HEART RATE: 73 BPM | RESPIRATION RATE: 16 BRPM | WEIGHT: 157.4 LBS | OXYGEN SATURATION: 96 % | BODY MASS INDEX: 27.89 KG/M2

## 2019-05-21 DIAGNOSIS — K74.60 CIRRHOSIS OF LIVER WITHOUT ASCITES, UNSPECIFIED HEPATIC CIRRHOSIS TYPE (H): ICD-10-CM

## 2019-05-21 DIAGNOSIS — K74.60 CIRRHOSIS OF LIVER WITHOUT ASCITES, UNSPECIFIED HEPATIC CIRRHOSIS TYPE (H): Primary | ICD-10-CM

## 2019-05-21 LAB
AFP SERPL-MCNC: 2.9 UG/L (ref 0–8)
ALBUMIN SERPL-MCNC: 3.5 G/DL (ref 3.4–5)
ALP SERPL-CCNC: 76 U/L (ref 40–150)
ALT SERPL W P-5'-P-CCNC: 54 U/L (ref 0–50)
ANION GAP SERPL CALCULATED.3IONS-SCNC: 5 MMOL/L (ref 3–14)
AST SERPL W P-5'-P-CCNC: 48 U/L (ref 0–45)
BILIRUB DIRECT SERPL-MCNC: 0.3 MG/DL (ref 0–0.2)
BILIRUB SERPL-MCNC: 1.1 MG/DL (ref 0.2–1.3)
BUN SERPL-MCNC: 14 MG/DL (ref 7–30)
CALCIUM SERPL-MCNC: 8.7 MG/DL (ref 8.5–10.1)
CHLORIDE SERPL-SCNC: 109 MMOL/L (ref 94–109)
CO2 SERPL-SCNC: 29 MMOL/L (ref 20–32)
CREAT SERPL-MCNC: 0.72 MG/DL (ref 0.52–1.04)
ERYTHROCYTE [DISTWIDTH] IN BLOOD BY AUTOMATED COUNT: 13.5 % (ref 10–15)
GFR SERPL CREATININE-BSD FRML MDRD: 87 ML/MIN/{1.73_M2}
GLUCOSE SERPL-MCNC: 115 MG/DL (ref 70–99)
HCT VFR BLD AUTO: 40.6 % (ref 35–47)
HGB BLD-MCNC: 13.5 G/DL (ref 11.7–15.7)
INR PPP: 1.16 (ref 0.86–1.14)
MCH RBC QN AUTO: 29.5 PG (ref 26.5–33)
MCHC RBC AUTO-ENTMCNC: 33.3 G/DL (ref 31.5–36.5)
MCV RBC AUTO: 89 FL (ref 78–100)
PLATELET # BLD AUTO: 79 10E9/L (ref 150–450)
POTASSIUM SERPL-SCNC: 3.7 MMOL/L (ref 3.4–5.3)
PROT SERPL-MCNC: 6.3 G/DL (ref 6.8–8.8)
RBC # BLD AUTO: 4.57 10E12/L (ref 3.8–5.2)
SODIUM SERPL-SCNC: 143 MMOL/L (ref 133–144)
WBC # BLD AUTO: 2.4 10E9/L (ref 4–11)

## 2019-05-21 PROCEDURE — 82105 ALPHA-FETOPROTEIN SERUM: CPT | Performed by: INTERNAL MEDICINE

## 2019-05-21 PROCEDURE — 80048 BASIC METABOLIC PNL TOTAL CA: CPT | Performed by: INTERNAL MEDICINE

## 2019-05-21 PROCEDURE — 80076 HEPATIC FUNCTION PANEL: CPT | Performed by: INTERNAL MEDICINE

## 2019-05-21 PROCEDURE — 85027 COMPLETE CBC AUTOMATED: CPT | Performed by: INTERNAL MEDICINE

## 2019-05-21 PROCEDURE — 85610 PROTHROMBIN TIME: CPT | Performed by: INTERNAL MEDICINE

## 2019-05-21 PROCEDURE — G0463 HOSPITAL OUTPT CLINIC VISIT: HCPCS | Mod: ZF

## 2019-05-21 PROCEDURE — 36415 COLL VENOUS BLD VENIPUNCTURE: CPT | Performed by: INTERNAL MEDICINE

## 2019-05-21 RX ORDER — ALBUTEROL SULFATE 90 UG/1
2 AEROSOL, METERED RESPIRATORY (INHALATION) EVERY 6 HOURS PRN
COMMUNITY

## 2019-05-21 RX ORDER — IBUPROFEN 200 MG
200 TABLET ORAL EVERY 4 HOURS PRN
COMMUNITY

## 2019-05-21 ASSESSMENT — MIFFLIN-ST. JEOR: SCORE: 1223.09

## 2019-05-21 ASSESSMENT — PAIN SCALES - GENERAL: PAINLEVEL: NO PAIN (0)

## 2019-05-21 NOTE — LETTER
"5/21/2019      RE: Felisa Starks  [address]  [facility] [address]       I had the pleasure of seeing Felisa Starks for followup in the Liver Clinic at the [facility] on 05/21/2019.  Ms. Starks returns for followup of cirrhosis caused by chronic hepatitis C.  She is a sustained virologic responder to hepatitis C treatment.      She is doing well at this visit.  She denies any abdominal pain, itching or skin rash or fatigue.  She denies any increased abdominal girth.  She has mild lower extremity edema.  She has not had any gastrointestinal bleeding or any overt signs of hepatic encephalopathy.      She denies any fevers or chills, cough or shortness of breath.  She continues to have loose bowel movement in the morning.  She has stopped her Imodium, which I will have her restart.  She did have a recent colonoscopy that showed some diverticulosis but a normal-appearing colon, otherwise.  Her appetite has been good, her weight has been stable and there have been no other new events since she was last seen.     Current Outpatient Medications   Medication     albuterol (PROAIR HFA/PROVENTIL HFA/VENTOLIN HFA) 108 (90 Base) MCG/ACT inhaler     Ascorbic Acid (VITAMIN C) 500 MG CHEW     budesonide-formoterol (SYMBICORT) 160-4.5 MCG/ACT Inhaler     Calcium Carb-Cholecalciferol (CALCIUM 500 + D3 PO)     ibuprofen (ADVIL/MOTRIN) 200 MG tablet     loperamide (IMODIUM A-D) 2 MG tablet     Multiple Vitamins-Minerals (MULTIVITAMIN ADULT PO)     fluticasone (FLOVENT HFA) 220 MCG/ACT Inhaler     No current facility-administered medications for this visit.      /82 (BP Location: Right arm, Patient Position: Sitting, Cuff Size: Adult Regular)   Pulse 73   Temp 97.6  F (36.4  C) (Oral)   Resp 16   Ht 1.6 m (5' 3\")   Wt 71.4 kg (157 lb 6.4 oz)   SpO2 96%   BMI 27.88 kg/m       In general she looks well.  HEENT exam shows no scleral icterus or temporal muscle wasting.  Her chest is clear.  Her " abdominal exam shows no increase in girth.  No masses or tenderness to palpation are present.  Her liver is 10 cm in span without left lobe enlargement.  No spleen tip is palpable.  Extremity exam shows no edema.  Skin exam shows no stigmata of chronic liver disease.  Neurologic exam shows no asterixis.     Recent Results (from the past 168 hour(s))   INR [UMY8175]    Collection Time: 05/21/19  5:45 AM   Result Value Ref Range    INR 1.16 (H) 0.86 - 1.14   CBC with platelets [[id number]]    Collection Time: 05/21/19  5:45 AM   Result Value Ref Range    WBC 2.4 (L) 4.0 - 11.0 10e9/L    RBC Count 4.57 3.8 - 5.2 10e12/L    Hemoglobin 13.5 11.7 - 15.7 g/dL    Hematocrit 40.6 35.0 - 47.0 %    MCV 89 78 - 100 fl    MCH 29.5 26.5 - 33.0 pg    MCHC 33.3 31.5 - 36.5 g/dL    RDW 13.5 10.0 - 15.0 %    Platelet Count 79 (L) 150 - 450 10e9/L   Basic metabolic panel [LAB15]    Collection Time: 05/21/19  5:45 AM   Result Value Ref Range    Sodium 143 133 - 144 mmol/L    Potassium 3.7 3.4 - 5.3 mmol/L    Chloride 109 94 - 109 mmol/L    Carbon Dioxide 29 20 - 32 mmol/L    Anion Gap 5 3 - 14 mmol/L    Glucose 115 (H) 70 - 99 mg/dL    Urea Nitrogen 14 7 - 30 mg/dL    Creatinine 0.72 0.52 - 1.04 mg/dL    GFR Estimate 87 >60 mL/min/[1.73_m2]    GFR Estimate If Black >90 >60 mL/min/[1.73_m2]    Calcium 8.7 8.5 - 10.1 mg/dL   Hepatic Panel [LAB20]    Collection Time: 05/21/19  5:45 AM   Result Value Ref Range    Bilirubin Direct 0.3 (H) 0.0 - 0.2 mg/dL    Bilirubin Total 1.1 0.2 - 1.3 mg/dL    Albumin 3.5 3.4 - 5.0 g/dL    Protein Total 6.3 (L) 6.8 - 8.8 g/dL    Alkaline Phosphatase 76 40 - 150 U/L    ALT 54 (H) 0 - 50 U/L    AST 48 (H) 0 - 45 U/L      I reviewed her ultrasound which shows a cirrhotic-appearing liver, no mass lesions were seen and there is no ascites.      IMPRESSION:  Ms. Starks has very well-compensated cirrhosis caused by chronic hepatitis C for which she is a sustained virologic responder.  She did have some pain in her  ankle and had x-rays and an orthopedist that commented she might have some degree of osteoporosis.  Her last bone density was 2-1/2 years ago and showed a very normal bone density at that point in time.  I have recommended therefore she may want to get her bone density repeated which can be done in [place].  She should be taking some vitamin D in addition to the calcium that she is taking and she can take 2000 units per day.      In terms of her irritable bowel syndrome, I have recommended that she go back on her Imodium, taking 1-2 in the morning and 1 after each loose bowel movement.  She did have a recent upper GI endoscopy and colonoscopy that showed trivial esophageal varices.  I will not make any other change to her medical regimen and I will see her back in the clinic in 6 months.      Thank you very much for allowing me to participate in the care of this patient.  If you have any questions regarding my recommendations, please do not hesitate to contact me.       Abbe Reynolds MD      Professor of Medicine  [employer]      Executive Medical Director, Solid Organ Transplant Program  [facility]    Abbe Reynolds MD

## 2019-05-21 NOTE — NURSING NOTE
"Chief Complaint   Patient presents with     RECHECK     Cirrhosis       Vital signs:  Temp: 97.6  F (36.4  C) Temp src: Oral BP: 131/82 Pulse: 73   Resp: 16 SpO2: 96 %     Height: 160 cm (5' 3\") Weight: 71.4 kg (157 lb 6.4 oz)  Estimated body mass index is 27.88 kg/m  as calculated from the following:    Height as of this encounter: 1.6 m (5' 3\").    Weight as of this encounter: 71.4 kg (157 lb 6.4 oz).          Hollie Leiva [facility]  5/21/2019 8:15 AM      "

## 2019-05-21 NOTE — LETTER
5/21/2019     RE: Felisa Starks  [address]  [facility] [address]     Dear Colleague,    Thank you for referring your patient, Felisa Starks, to the [facility] HEPATOLOGY at [facility]. Please see a copy of my visit note below.    I had the pleasure of seeing Felisa Starks for followup in the Liver Clinic at the [facility] on 05/21/2019.  Ms. Starks returns for followup of cirrhosis caused by chronic hepatitis C.  She is a sustained virologic responder to hepatitis C treatment.      She is doing well at this visit.  She denies any abdominal pain, itching or skin rash or fatigue.  She denies any increased abdominal girth.  She has mild lower extremity edema.  She has not had any gastrointestinal bleeding or any overt signs of hepatic encephalopathy.      She denies any fevers or chills, cough or shortness of breath.  She continues to have loose bowel movement in the morning.  She has stopped her Imodium, which I will have her restart.  She did have a recent colonoscopy that showed some diverticulosis but a normal-appearing colon, otherwise.  Her appetite has been good, her weight has been stable and there have been no other new events since she was last seen.     Current Outpatient Medications   Medication     albuterol (PROAIR HFA/PROVENTIL HFA/VENTOLIN HFA) 108 (90 Base) MCG/ACT inhaler     Ascorbic Acid (VITAMIN C) 500 MG CHEW     budesonide-formoterol (SYMBICORT) 160-4.5 MCG/ACT Inhaler     Calcium Carb-Cholecalciferol (CALCIUM 500 + D3 PO)     ibuprofen (ADVIL/MOTRIN) 200 MG tablet     loperamide (IMODIUM A-D) 2 MG tablet     Multiple Vitamins-Minerals (MULTIVITAMIN ADULT PO)     fluticasone (FLOVENT HFA) 220 MCG/ACT Inhaler     No current facility-administered medications for this visit.      /82 (BP Location: Right arm, Patient Position: Sitting, Cuff Size: Adult Regular)   Pulse 73   Temp 97.6  F (36.4  C) (Oral)   Resp 16   Ht 1.6 m (5'  "3\")   Wt 71.4 kg (157 lb 6.4 oz)   SpO2 96%   BMI 27.88 kg/m       In general she looks well.  HEENT exam shows no scleral icterus or temporal muscle wasting.  Her chest is clear.  Her abdominal exam shows no increase in girth.  No masses or tenderness to palpation are present.  Her liver is 10 cm in span without left lobe enlargement.  No spleen tip is palpable.  Extremity exam shows no edema.  Skin exam shows no stigmata of chronic liver disease.  Neurologic exam shows no asterixis.     Recent Results (from the past 168 hour(s))   INR [[id number]]    Collection Time: 05/21/19  5:45 AM   Result Value Ref Range    INR 1.16 (H) 0.86 - 1.14   CBC with platelets [[id number]]    Collection Time: 05/21/19  5:45 AM   Result Value Ref Range    WBC 2.4 (L) 4.0 - 11.0 10e9/L    RBC Count 4.57 3.8 - 5.2 10e12/L    Hemoglobin 13.5 11.7 - 15.7 g/dL    Hematocrit 40.6 35.0 - 47.0 %    MCV 89 78 - 100 fl    MCH 29.5 26.5 - 33.0 pg    MCHC 33.3 31.5 - 36.5 g/dL    RDW 13.5 10.0 - 15.0 %    Platelet Count 79 (L) 150 - 450 10e9/L   Basic metabolic panel [LAB15]    Collection Time: 05/21/19  5:45 AM   Result Value Ref Range    Sodium 143 133 - 144 mmol/L    Potassium 3.7 3.4 - 5.3 mmol/L    Chloride 109 94 - 109 mmol/L    Carbon Dioxide 29 20 - 32 mmol/L    Anion Gap 5 3 - 14 mmol/L    Glucose 115 (H) 70 - 99 mg/dL    Urea Nitrogen 14 7 - 30 mg/dL    Creatinine 0.72 0.52 - 1.04 mg/dL    GFR Estimate 87 >60 mL/min/[1.73_m2]    GFR Estimate If Black >90 >60 mL/min/[1.73_m2]    Calcium 8.7 8.5 - 10.1 mg/dL   Hepatic Panel [LAB20]    Collection Time: 05/21/19  5:45 AM   Result Value Ref Range    Bilirubin Direct 0.3 (H) 0.0 - 0.2 mg/dL    Bilirubin Total 1.1 0.2 - 1.3 mg/dL    Albumin 3.5 3.4 - 5.0 g/dL    Protein Total 6.3 (L) 6.8 - 8.8 g/dL    Alkaline Phosphatase 76 40 - 150 U/L    ALT 54 (H) 0 - 50 U/L    AST 48 (H) 0 - 45 U/L      I reviewed her ultrasound which shows a cirrhotic-appearing liver, no mass lesions were seen and there is " no ascites.      IMPRESSION:  Ms. Starks has very well-compensated cirrhosis caused by chronic hepatitis C for which she is a sustained virologic responder.  She did have some pain in her ankle and had x-rays and an orthopedist that commented she might have some degree of osteoporosis.  Her last bone density was 2-1/2 years ago and showed a very normal bone density at that point in time.  I have recommended therefore she may want to get her bone density repeated which can be done in [place].  She should be taking some vitamin D in addition to the calcium that she is taking and she can take 2000 units per day.      In terms of her irritable bowel syndrome, I have recommended that she go back on her Imodium, taking 1-2 in the morning and 1 after each loose bowel movement.  She did have a recent upper GI endoscopy and colonoscopy that showed trivial esophageal varices.  I will not make any other change to her medical regimen and I will see her back in the clinic in 6 months.      Thank you very much for allowing me to participate in the care of this patient.  If you have any questions regarding my recommendations, please do not hesitate to contact me.       Abbe Reynolds MD      Professor of Medicine  [employer]      Executive Medical Director, Solid Organ Transplant Program  [facility]

## 2019-10-04 ENCOUNTER — HEALTH MAINTENANCE LETTER (OUTPATIENT)
Age: 69
End: 2019-10-04

## 2019-11-19 ENCOUNTER — OFFICE VISIT (OUTPATIENT)
Dept: GASTROENTEROLOGY | Facility: CLINIC | Age: 69
End: 2019-11-19
Attending: INTERNAL MEDICINE
Payer: MEDICARE

## 2019-11-19 ENCOUNTER — ANCILLARY PROCEDURE (OUTPATIENT)
Dept: ULTRASOUND IMAGING | Facility: CLINIC | Age: 69
End: 2019-11-19
Attending: INTERNAL MEDICINE
Payer: MEDICARE

## 2019-11-19 VITALS
BODY MASS INDEX: 28.87 KG/M2 | SYSTOLIC BLOOD PRESSURE: 128 MMHG | HEART RATE: 77 BPM | TEMPERATURE: 97.6 F | OXYGEN SATURATION: 96 % | DIASTOLIC BLOOD PRESSURE: 84 MMHG | WEIGHT: 163 LBS

## 2019-11-19 DIAGNOSIS — K74.60 CIRRHOSIS OF LIVER WITHOUT ASCITES, UNSPECIFIED HEPATIC CIRRHOSIS TYPE (H): ICD-10-CM

## 2019-11-19 DIAGNOSIS — K74.60 CIRRHOSIS OF LIVER WITH ASCITES, UNSPECIFIED HEPATIC CIRRHOSIS TYPE (H): ICD-10-CM

## 2019-11-19 DIAGNOSIS — R18.8 CIRRHOSIS OF LIVER WITH ASCITES, UNSPECIFIED HEPATIC CIRRHOSIS TYPE (H): ICD-10-CM

## 2019-11-19 DIAGNOSIS — K74.60 CIRRHOSIS OF LIVER WITHOUT ASCITES, UNSPECIFIED HEPATIC CIRRHOSIS TYPE (H): Primary | ICD-10-CM

## 2019-11-19 LAB
AFP SERPL-MCNC: 2.4 UG/L (ref 0–8)
ALBUMIN SERPL-MCNC: 3.7 G/DL (ref 3.4–5)
ALP SERPL-CCNC: 81 U/L (ref 40–150)
ALT SERPL W P-5'-P-CCNC: 60 U/L (ref 0–50)
ANION GAP SERPL CALCULATED.3IONS-SCNC: 4 MMOL/L (ref 3–14)
AST SERPL W P-5'-P-CCNC: 45 U/L (ref 0–45)
BILIRUB DIRECT SERPL-MCNC: 0.3 MG/DL (ref 0–0.2)
BILIRUB SERPL-MCNC: 1 MG/DL (ref 0.2–1.3)
BUN SERPL-MCNC: 15 MG/DL (ref 7–30)
CALCIUM SERPL-MCNC: 8.9 MG/DL (ref 8.5–10.1)
CHLORIDE SERPL-SCNC: 107 MMOL/L (ref 94–109)
CO2 SERPL-SCNC: 30 MMOL/L (ref 20–32)
CREAT SERPL-MCNC: 0.7 MG/DL (ref 0.52–1.04)
ERYTHROCYTE [DISTWIDTH] IN BLOOD BY AUTOMATED COUNT: 13.3 % (ref 10–15)
GFR SERPL CREATININE-BSD FRML MDRD: 89 ML/MIN/{1.73_M2}
GLUCOSE SERPL-MCNC: 123 MG/DL (ref 70–99)
HCT VFR BLD AUTO: 42.2 % (ref 35–47)
HGB BLD-MCNC: 14 G/DL (ref 11.7–15.7)
INR PPP: 1.1 (ref 0.86–1.14)
MCH RBC QN AUTO: 29.6 PG (ref 26.5–33)
MCHC RBC AUTO-ENTMCNC: 33.2 G/DL (ref 31.5–36.5)
MCV RBC AUTO: 89 FL (ref 78–100)
PLATELET # BLD AUTO: 90 10E9/L (ref 150–450)
POTASSIUM SERPL-SCNC: 4 MMOL/L (ref 3.4–5.3)
PROT SERPL-MCNC: 6.9 G/DL (ref 6.8–8.8)
RBC # BLD AUTO: 4.73 10E12/L (ref 3.8–5.2)
SODIUM SERPL-SCNC: 141 MMOL/L (ref 133–144)
WBC # BLD AUTO: 3 10E9/L (ref 4–11)

## 2019-11-19 PROCEDURE — 36415 COLL VENOUS BLD VENIPUNCTURE: CPT | Performed by: INTERNAL MEDICINE

## 2019-11-19 PROCEDURE — 80048 BASIC METABOLIC PNL TOTAL CA: CPT | Performed by: INTERNAL MEDICINE

## 2019-11-19 PROCEDURE — 85610 PROTHROMBIN TIME: CPT | Performed by: INTERNAL MEDICINE

## 2019-11-19 PROCEDURE — 85027 COMPLETE CBC AUTOMATED: CPT | Performed by: INTERNAL MEDICINE

## 2019-11-19 PROCEDURE — G0463 HOSPITAL OUTPT CLINIC VISIT: HCPCS | Mod: ZF

## 2019-11-19 PROCEDURE — 80076 HEPATIC FUNCTION PANEL: CPT | Performed by: INTERNAL MEDICINE

## 2019-11-19 PROCEDURE — 82105 ALPHA-FETOPROTEIN SERUM: CPT | Performed by: INTERNAL MEDICINE

## 2019-11-19 ASSESSMENT — PAIN SCALES - GENERAL: PAINLEVEL: NO PAIN (0)

## 2019-11-19 NOTE — NURSING NOTE
Chief Complaint   Patient presents with     RECHECK     Hep C follow up     /84 (BP Location: Right arm, Patient Position: Sitting, Cuff Size: Adult Regular)   Pulse 77   Temp 97.6  F (36.4  C)   Wt 73.9 kg (163 lb)   SpO2 96%   BMI 28.87 kg/m        Hai Reed, EMT

## 2019-11-19 NOTE — LETTER
11/19/2019       RE: Felisa Starks  [address]  [facility] [address]     Dear Colleague,    Thank you for referring your patient, Felisa Starks, to the [facility] HEPATOLOGY at [facility]. Please see a copy of my visit note below.    SUBJECTIVE:  I had the pleasure of seeing Felisa Starks for followup in the Liver Clinic at the [facility] on 11/19/2019.  Ms. Starks returns for followup of cirrhosis caused by chronic hepatitis C.  She is a sustained virologic responder to hepatitis C treatment.      She is doing well at this visit.  She denies any abdominal pain, itching or skin rash or fatigue.  She is actually working 2 jobs and also takes care of her mother.  She denies any increased abdominal girth or lower extremity edema.  She has not had any gastrointestinal bleeding or any overt signs of hepatic encephalopathy.  She reports that she recently had an upper GI endoscopy by Dr. Leach that did not require any treatment for varices.  She also had a colonoscopy with some polyps and she reports that she was told to come back in 10 years.      She denies any fevers or chills.  She does have some shortness of breath but denies any cough.  She denies any nausea or vomiting.  She does have chronic loose bowel movements.  She does use Imodium which certainly does work.  Her appetite has been good, and her weight is up 4 pounds.     Current Outpatient Medications   Medication     albuterol (PROAIR HFA/PROVENTIL HFA/VENTOLIN HFA) 108 (90 Base) MCG/ACT inhaler     Ascorbic Acid (VITAMIN C) 500 MG CHEW     budesonide-formoterol (SYMBICORT) 160-4.5 MCG/ACT Inhaler     Calcium Carb-Cholecalciferol (CALCIUM 500 + D3 PO)     fluticasone (FLOVENT HFA) 220 MCG/ACT Inhaler     ibuprofen (ADVIL/MOTRIN) 200 MG tablet     loperamide (IMODIUM A-D) 2 MG tablet     Multiple Vitamins-Minerals (MULTIVITAMIN ADULT PO)     No current facility-administered medications for this  visit.      /84 (BP Location: Right arm, Patient Position: Sitting, Cuff Size: Adult Regular)   Pulse 77   Temp 97.6  F (36.4  C)   Wt 73.9 kg (163 lb)   SpO2 96%   BMI 28.87 kg/m       PHYSICAL EXAMINATION:  In general, she looks quite well.  HEENT exam shows no scleral icterus or temporal muscle wasting.  Her chest is clear.  Abdominal exam shows no increase in girth.  No masses or tenderness to palpation are present.  Her liver is 10 cm in span without left lobe enlargement.  No spleen tip is palpable, and extremity exam shows no edema.  Skin exam shows no stigmata of chronic liver disease.  Neurologic exam shows no asterixis.  I should point out that I did look at her toenails and she probably does have a fungal infection of the 2 largest toenails; however, overall it is fairly mild infection.     Recent Results (from the past 168 hour(s))   INR [[id number]]    Collection Time: 11/19/19  5:58 AM   Result Value Ref Range    INR 1.10 0.86 - 1.14   CBC with platelets [[id number]]    Collection Time: 11/19/19  5:58 AM   Result Value Ref Range    WBC 3.0 (L) 4.0 - 11.0 10e9/L    RBC Count 4.73 3.8 - 5.2 10e12/L    Hemoglobin 14.0 11.7 - 15.7 g/dL    Hematocrit 42.2 35.0 - 47.0 %    MCV 89 78 - 100 fl    MCH 29.6 26.5 - 33.0 pg    MCHC 33.2 31.5 - 36.5 g/dL    RDW 13.3 10.0 - 15.0 %    Platelet Count 90 (L) 150 - 450 10e9/L   Basic metabolic panel [LAB15]    Collection Time: 11/19/19  5:58 AM   Result Value Ref Range    Sodium 141 133 - 144 mmol/L    Potassium 4.0 3.4 - 5.3 mmol/L    Chloride 107 94 - 109 mmol/L    Carbon Dioxide 30 20 - 32 mmol/L    Anion Gap 4 3 - 14 mmol/L    Glucose 123 (H) 70 - 99 mg/dL    Urea Nitrogen 15 7 - 30 mg/dL    Creatinine 0.70 0.52 - 1.04 mg/dL    GFR Estimate 89 >60 mL/min/[1.73_m2]    GFR Estimate If Black >90 >60 mL/min/[1.73_m2]    Calcium 8.9 8.5 - 10.1 mg/dL   Hepatic Panel [LAB20]    Collection Time: 11/19/19  5:58 AM   Result Value Ref Range    Bilirubin Direct 0.3 (H) 0.0  - 0.2 mg/dL    Bilirubin Total 1.0 0.2 - 1.3 mg/dL    Albumin 3.7 3.4 - 5.0 g/dL    Protein Total 6.9 6.8 - 8.8 g/dL    Alkaline Phosphatase 81 40 - 150 U/L    ALT 60 (H) 0 - 50 U/L    AST 45 0 - 45 U/L      IMAGING:  I did review her ultrasound which shows no mass lesions and no ascites.  No other abnormalities were noted.      IMPRESSION:  My impression is Ms. Starks has well-compensated cirrhosis caused by chronic hepatitis C for which she is a sustained virologic responder.  She is up to date with regard to vaccines.  She had a flu shot and she is up to date on pneumococcal vaccination.  She is up to date with regard to variceal screening and colorectal cancer screening as well.      I would not treat her toenail fungus.  Probably the safest thing would be to try some itraconazole but often that is not covered by insurance.  She could get some griseofulvin but I do not think it is really severe enough to merit treatment at this point in time.      My plan will be to see the patient back in the clinic in 6 months.      Thank you very much for allowing me to participate in the care of this patient.  If you have any questions regarding my recommendations, please do not hesitate to contact me.       Abbe Reynolds MD      Professor of Medicine  [employer]      Executive Medical Director, Solid Organ Transplant Program  [facility]     Again, thank you for allowing me to participate in the care of your patient.      Sincerely,    Abbe Reynolds MD

## 2019-11-19 NOTE — LETTER
11/19/2019      RE: Felisa Starks  [address]  [facility] [address]       SUBJECTIVE:  I had the pleasure of seeing Felisa Starks for followup in the Liver Clinic at the [facility] on 11/19/2019.  Ms. Starks returns for followup of cirrhosis caused by chronic hepatitis C.  She is a sustained virologic responder to hepatitis C treatment.      She is doing well at this visit.  She denies any abdominal pain, itching or skin rash or fatigue.  She is actually working 2 jobs and also takes care of her mother.  She denies any increased abdominal girth or lower extremity edema.  She has not had any gastrointestinal bleeding or any overt signs of hepatic encephalopathy.  She reports that she recently had an upper GI endoscopy by Dr. Leach that did not require any treatment for varices.  She also had a colonoscopy with some polyps and she reports that she was told to come back in 10 years.      She denies any fevers or chills.  She does have some shortness of breath but denies any cough.  She denies any nausea or vomiting.  She does have chronic loose bowel movements.  She does use Imodium which certainly does work.  Her appetite has been good, and her weight is up 4 pounds.     Current Outpatient Medications   Medication     albuterol (PROAIR HFA/PROVENTIL HFA/VENTOLIN HFA) 108 (90 Base) MCG/ACT inhaler     Ascorbic Acid (VITAMIN C) 500 MG CHEW     budesonide-formoterol (SYMBICORT) 160-4.5 MCG/ACT Inhaler     Calcium Carb-Cholecalciferol (CALCIUM 500 + D3 PO)     fluticasone (FLOVENT HFA) 220 MCG/ACT Inhaler     ibuprofen (ADVIL/MOTRIN) 200 MG tablet     loperamide (IMODIUM A-D) 2 MG tablet     Multiple Vitamins-Minerals (MULTIVITAMIN ADULT PO)     No current facility-administered medications for this visit.      /84 (BP Location: Right arm, Patient Position: Sitting, Cuff Size: Adult Regular)   Pulse 77   Temp 97.6  F (36.4  C)   Wt 73.9 kg (163 lb)   SpO2 96%   BMI 28.87 kg/m        PHYSICAL EXAMINATION:  In general, she looks quite well.  HEENT exam shows no scleral icterus or temporal muscle wasting.  Her chest is clear.  Abdominal exam shows no increase in girth.  No masses or tenderness to palpation are present.  Her liver is 10 cm in span without left lobe enlargement.  No spleen tip is palpable, and extremity exam shows no edema.  Skin exam shows no stigmata of chronic liver disease.  Neurologic exam shows no asterixis.  I should point out that I did look at her toenails and she probably does have a fungal infection of the 2 largest toenails; however, overall it is fairly mild infection.     Recent Results (from the past 168 hour(s))   INR [[id number]]    Collection Time: 11/19/19  5:58 AM   Result Value Ref Range    INR 1.10 0.86 - 1.14   CBC with platelets [[id number]]    Collection Time: 11/19/19  5:58 AM   Result Value Ref Range    WBC 3.0 (L) 4.0 - 11.0 10e9/L    RBC Count 4.73 3.8 - 5.2 10e12/L    Hemoglobin 14.0 11.7 - 15.7 g/dL    Hematocrit 42.2 35.0 - 47.0 %    MCV 89 78 - 100 fl    MCH 29.6 26.5 - 33.0 pg    MCHC 33.2 31.5 - 36.5 g/dL    RDW 13.3 10.0 - 15.0 %    Platelet Count 90 (L) 150 - 450 10e9/L   Basic metabolic panel [LAB15]    Collection Time: 11/19/19  5:58 AM   Result Value Ref Range    Sodium 141 133 - 144 mmol/L    Potassium 4.0 3.4 - 5.3 mmol/L    Chloride 107 94 - 109 mmol/L    Carbon Dioxide 30 20 - 32 mmol/L    Anion Gap 4 3 - 14 mmol/L    Glucose 123 (H) 70 - 99 mg/dL    Urea Nitrogen 15 7 - 30 mg/dL    Creatinine 0.70 0.52 - 1.04 mg/dL    GFR Estimate 89 >60 mL/min/[1.73_m2]    GFR Estimate If Black >90 >60 mL/min/[1.73_m2]    Calcium 8.9 8.5 - 10.1 mg/dL   Hepatic Panel [LAB20]    Collection Time: 11/19/19  5:58 AM   Result Value Ref Range    Bilirubin Direct 0.3 (H) 0.0 - 0.2 mg/dL    Bilirubin Total 1.0 0.2 - 1.3 mg/dL    Albumin 3.7 3.4 - 5.0 g/dL    Protein Total 6.9 6.8 - 8.8 g/dL    Alkaline Phosphatase 81 40 - 150 U/L    ALT 60 (H) 0 - 50 U/L    AST 45 0  - 45 U/L      IMAGING:  I did review her ultrasound which shows no mass lesions and no ascites.  No other abnormalities were noted.      IMPRESSION:  My impression is Ms. Starks has well-compensated cirrhosis caused by chronic hepatitis C for which she is a sustained virologic responder.  She is up to date with regard to vaccines.  She had a flu shot and she is up to date on pneumococcal vaccination.  She is up to date with regard to variceal screening and colorectal cancer screening as well.      I would not treat her toenail fungus.  Probably the safest thing would be to try some itraconazole but often that is not covered by insurance.  She could get some griseofulvin but I do not think it is really severe enough to merit treatment at this point in time.      My plan will be to see the patient back in the clinic in 6 months.      Thank you very much for allowing me to participate in the care of this patient.  If you have any questions regarding my recommendations, please do not hesitate to contact me.       Abbe Reyonlds MD      Professor of Medicine  [employer] [employer]      Executive Medical Director, Solid Organ Transplant Program  [facility]

## 2019-11-19 NOTE — PROGRESS NOTES
SUBJECTIVE:  I had the pleasure of seeing Felisa Starks for followup in the Liver Clinic at the [facility] on 11/19/2019.  Ms. Starks returns for followup of cirrhosis caused by chronic hepatitis C.  She is a sustained virologic responder to hepatitis C treatment.      She is doing well at this visit.  She denies any abdominal pain, itching or skin rash or fatigue.  She is actually working 2 jobs and also takes care of her mother.  She denies any increased abdominal girth or lower extremity edema.  She has not had any gastrointestinal bleeding or any overt signs of hepatic encephalopathy.  She reports that she recently had an upper GI endoscopy by Dr. Leach that did not require any treatment for varices.  She also had a colonoscopy with some polyps and she reports that she was told to come back in 10 years.      She denies any fevers or chills.  She does have some shortness of breath but denies any cough.  She denies any nausea or vomiting.  She does have chronic loose bowel movements.  She does use Imodium which certainly does work.  Her appetite has been good, and her weight is up 4 pounds.     Current Outpatient Medications   Medication     albuterol (PROAIR HFA/PROVENTIL HFA/VENTOLIN HFA) 108 (90 Base) MCG/ACT inhaler     Ascorbic Acid (VITAMIN C) 500 MG CHEW     budesonide-formoterol (SYMBICORT) 160-4.5 MCG/ACT Inhaler     Calcium Carb-Cholecalciferol (CALCIUM 500 + D3 PO)     fluticasone (FLOVENT HFA) 220 MCG/ACT Inhaler     ibuprofen (ADVIL/MOTRIN) 200 MG tablet     loperamide (IMODIUM A-D) 2 MG tablet     Multiple Vitamins-Minerals (MULTIVITAMIN ADULT PO)     No current facility-administered medications for this visit.      /84 (BP Location: Right arm, Patient Position: Sitting, Cuff Size: Adult Regular)   Pulse 77   Temp 97.6  F (36.4  C)   Wt 73.9 kg (163 lb)   SpO2 96%   BMI 28.87 kg/m      PHYSICAL EXAMINATION:  In general, she looks quite well.  HEENT exam  shows no scleral icterus or temporal muscle wasting.  Her chest is clear.  Abdominal exam shows no increase in girth.  No masses or tenderness to palpation are present.  Her liver is 10 cm in span without left lobe enlargement.  No spleen tip is palpable, and extremity exam shows no edema.  Skin exam shows no stigmata of chronic liver disease.  Neurologic exam shows no asterixis.  I should point out that I did look at her toenails and she probably does have a fungal infection of the 2 largest toenails; however, overall it is fairly mild infection.     Recent Results (from the past 168 hour(s))   INR [[id number]]    Collection Time: 11/19/19  5:58 AM   Result Value Ref Range    INR 1.10 0.86 - 1.14   CBC with platelets [[id number]]    Collection Time: 11/19/19  5:58 AM   Result Value Ref Range    WBC 3.0 (L) 4.0 - 11.0 10e9/L    RBC Count 4.73 3.8 - 5.2 10e12/L    Hemoglobin 14.0 11.7 - 15.7 g/dL    Hematocrit 42.2 35.0 - 47.0 %    MCV 89 78 - 100 fl    MCH 29.6 26.5 - 33.0 pg    MCHC 33.2 31.5 - 36.5 g/dL    RDW 13.3 10.0 - 15.0 %    Platelet Count 90 (L) 150 - 450 10e9/L   Basic metabolic panel [LAB15]    Collection Time: 11/19/19  5:58 AM   Result Value Ref Range    Sodium 141 133 - 144 mmol/L    Potassium 4.0 3.4 - 5.3 mmol/L    Chloride 107 94 - 109 mmol/L    Carbon Dioxide 30 20 - 32 mmol/L    Anion Gap 4 3 - 14 mmol/L    Glucose 123 (H) 70 - 99 mg/dL    Urea Nitrogen 15 7 - 30 mg/dL    Creatinine 0.70 0.52 - 1.04 mg/dL    GFR Estimate 89 >60 mL/min/[1.73_m2]    GFR Estimate If Black >90 >60 mL/min/[1.73_m2]    Calcium 8.9 8.5 - 10.1 mg/dL   Hepatic Panel [LAB20]    Collection Time: 11/19/19  5:58 AM   Result Value Ref Range    Bilirubin Direct 0.3 (H) 0.0 - 0.2 mg/dL    Bilirubin Total 1.0 0.2 - 1.3 mg/dL    Albumin 3.7 3.4 - 5.0 g/dL    Protein Total 6.9 6.8 - 8.8 g/dL    Alkaline Phosphatase 81 40 - 150 U/L    ALT 60 (H) 0 - 50 U/L    AST 45 0 - 45 U/L      IMAGING:  I did review her ultrasound which shows no mass  lesions and no ascites.  No other abnormalities were noted.      IMPRESSION:  My impression is Ms. Starks has well-compensated cirrhosis caused by chronic hepatitis C for which she is a sustained virologic responder.  She is up to date with regard to vaccines.  She had a flu shot and she is up to date on pneumococcal vaccination.  She is up to date with regard to variceal screening and colorectal cancer screening as well.      I would not treat her toenail fungus.  Probably the safest thing would be to try some itraconazole but often that is not covered by insurance.  She could get some griseofulvin but I do not think it is really severe enough to merit treatment at this point in time.      My plan will be to see the patient back in the clinic in 6 months.      Thank you very much for allowing me to participate in the care of this patient.  If you have any questions regarding my recommendations, please do not hesitate to contact me.       Abbe Reynolds MD      Professor of Medicine  [facility] [employer] [facility] [employer]      Executive Medical Director, Solid Organ Transplant Program  [facility]

## 2020-02-10 ENCOUNTER — HEALTH MAINTENANCE LETTER (OUTPATIENT)
Age: 70
End: 2020-02-10

## 2020-03-31 ENCOUNTER — DOCUMENTATION ONLY (OUTPATIENT)
Dept: CARE COORDINATION | Facility: CLINIC | Age: 70
End: 2020-03-31

## 2020-05-15 ENCOUNTER — TELEPHONE (OUTPATIENT)
Dept: GASTROENTEROLOGY | Facility: CLINIC | Age: 70
End: 2020-05-15

## 2020-05-19 ENCOUNTER — VIRTUAL VISIT (OUTPATIENT)
Dept: GASTROENTEROLOGY | Facility: CLINIC | Age: 70
End: 2020-05-19
Attending: INTERNAL MEDICINE
Payer: MEDICARE

## 2020-05-19 DIAGNOSIS — K74.60 CIRRHOSIS OF LIVER WITHOUT ASCITES, UNSPECIFIED HEPATIC CIRRHOSIS TYPE (H): Primary | ICD-10-CM

## 2020-05-19 ASSESSMENT — PAIN SCALES - GENERAL: PAINLEVEL: NO PAIN (0)

## 2020-05-19 NOTE — PROGRESS NOTES
"Felisa Starks is a 69 year old female who is being evaluated via a billable telephone visit.      The patient has been notified of following:     \"This telephone visit will be conducted via a call between you and your physician/provider. We have found that certain health care needs can be provided without the need for a physical exam.  This service lets us provide the care you need with a short phone conversation.  If a prescription is necessary we can send it directly to your pharmacy.  If lab work is needed we can place an order for that and you can then stop by our lab to have the test done at a later time.    Telephone visits are billed at different rates depending on your insurance coverage. During this emergency period, for some insurers they may be billed the same as an in-person visit.  Please reach out to your insurance provider with any questions.    If during the course of the call the physician/provider feels a telephone visit is not appropriate, you will not be charged for this service.\"    Patient has given verbal consent for Telephone visit?  Yes    What phone number would you like to be contacted at? [phone]    How would you like to obtain your AVS? By mail     I had the pleasure of seeing Felisa Starks for follow-up via phone visit in the Liver Clinic at the [facility] on 05/19/2020.  Ms. Starks returns for followup of cirrhosis caused by chronic hepatitis C.  She is a sustained virologic responder to hepatitis C treatment.      She is doing well at this visit.  She denies any abdominal pain, itching or skin rash or fatigue.  She is still busy and also takes care of her mother.  She denies any increased abdominal girth or lower extremity edema.  She has not had any gastrointestinal bleeding or any overt signs of hepatic encephalopathy.    She does note some muscle cramping at night.     She denies any fevers or chills.  She does have some shortness of breath but denies " any cough.  She denies any nausea or vomiting.  She does have chronic loose bowel movements.  She does use Imodium which certainly does work.  She is also using Metamucil which does provide some benefit. Her appetite has been good, and her weight is stable.     Current Outpatient Medications   Medication     Ascorbic Acid (VITAMIN C) 500 MG CHEW     Calcium Carb-Cholecalciferol (CALCIUM 500 + D3 PO)     ibuprofen (ADVIL/MOTRIN) 200 MG tablet     loperamide (IMODIUM A-D) 2 MG tablet     Multiple Vitamins-Minerals (MULTIVITAMIN ADULT PO)     albuterol (PROAIR HFA/PROVENTIL HFA/VENTOLIN HFA) 108 (90 Base) MCG/ACT inhaler     budesonide-formoterol (SYMBICORT) 160-4.5 MCG/ACT Inhaler     fluticasone (FLOVENT HFA) 220 MCG/ACT Inhaler     No current facility-administered medications for this visit.      On physical examination, she sounds in no acute distress.  Her affect was appropriate.    She will have blood work and ultrasound performed locally.    My impression is Ms. Starks has well-compensated cirrhosis caused by chronic hepatitis C for which she is a sustained virologic responder.  She is up to date with regard to vaccines. She is up to date with regard to variceal screening and colorectal cancer screening as well.      As I mentioned, she will have a bone work and an ultrasound performed locally.  For the cramping, I recommended she resume tonic water before going to bed.  My plan will be to see the patient back in the clinic in 6 months.      Thank you very much for allowing me to participate in the care of this patient.  If you have any questions regarding my recommendations, please do not hesitate to contact me.         Abbe Reynolds MD      Professor of Medicine  [employer]      Executive Medical Director, Solid Organ Transplant Program  [facility] Phone call duration: 15 minutes with 10 minutes for documentation.

## 2020-06-02 ENCOUNTER — TRANSFERRED RECORDS (OUTPATIENT)
Dept: HEALTH INFORMATION MANAGEMENT | Facility: CLINIC | Age: 70
End: 2020-06-02

## 2020-06-02 LAB
ALT SERPL-CCNC: 39 U/L (ref 7–43)
AST SERPL-CCNC: 38 U/L (ref 13–39)
CREAT SERPL-MCNC: 0.67 MG/DL (ref 0.6–1.1)
GFR SERPL CREATININE-BSD FRML MDRD: >60 ML/MIN
GLUCOSE SERPL-MCNC: 148 MG/DL (ref 75–99)
INR PPP: 1.07 (ref 0.9–1.1)
POTASSIUM SERPL-SCNC: 3.6 MEQ/L (ref 3.5–5.1)

## 2020-09-14 ENCOUNTER — TELEPHONE (OUTPATIENT)
Dept: GASTROENTEROLOGY | Facility: CLINIC | Age: 70
End: 2020-09-14

## 2020-09-14 NOTE — TELEPHONE ENCOUNTER
Pt appointment changed to in person.    Jenny Reid LPN  Hepatology Clinic    --------   Health Call Center    Phone Message    May a detailed message be left on voicemail: yes     Reason for Call: Other: Pt wishes for her virtual appt to be changed into an in-person appt. Claims she is from [place]. Please call her back at [phone]. Thanks!     Action Taken: Message routed to:  [facility] hep    Travel Screening: Not Applicable

## 2020-11-10 DIAGNOSIS — K74.60 CIRRHOSIS OF LIVER WITHOUT ASCITES, UNSPECIFIED HEPATIC CIRRHOSIS TYPE (H): Primary | ICD-10-CM

## 2020-11-14 ENCOUNTER — HEALTH MAINTENANCE LETTER (OUTPATIENT)
Age: 70
End: 2020-11-14

## 2020-11-16 ENCOUNTER — DOCUMENTATION ONLY (OUTPATIENT)
Dept: CARE COORDINATION | Facility: CLINIC | Age: 70
End: 2020-11-16

## 2020-11-19 ENCOUNTER — VIRTUAL VISIT (OUTPATIENT)
Dept: GASTROENTEROLOGY | Facility: CLINIC | Age: 70
End: 2020-11-19
Attending: INTERNAL MEDICINE
Payer: MEDICARE

## 2020-11-19 ENCOUNTER — ANCILLARY PROCEDURE (OUTPATIENT)
Dept: ULTRASOUND IMAGING | Facility: CLINIC | Age: 70
End: 2020-11-19
Attending: INTERNAL MEDICINE
Payer: MEDICARE

## 2020-11-19 DIAGNOSIS — D61.818 ACQUIRED PANCYTOPENIA (H): ICD-10-CM

## 2020-11-19 DIAGNOSIS — J43.1 PANLOBULAR EMPHYSEMA (H): ICD-10-CM

## 2020-11-19 DIAGNOSIS — K74.60 CIRRHOSIS OF LIVER WITHOUT ASCITES, UNSPECIFIED HEPATIC CIRRHOSIS TYPE (H): Primary | ICD-10-CM

## 2020-11-19 DIAGNOSIS — K74.60 CIRRHOSIS OF LIVER WITHOUT ASCITES, UNSPECIFIED HEPATIC CIRRHOSIS TYPE (H): ICD-10-CM

## 2020-11-19 DIAGNOSIS — I85.00 ESOPHAGEAL VARICES WITHOUT BLEEDING, UNSPECIFIED ESOPHAGEAL VARICES TYPE (H): ICD-10-CM

## 2020-11-19 LAB
AFP SERPL-MCNC: 2.2 UG/L (ref 0–8)
ALBUMIN SERPL-MCNC: 3.6 G/DL (ref 3.4–5)
ALP SERPL-CCNC: 78 U/L (ref 40–150)
ALT SERPL W P-5'-P-CCNC: 53 U/L (ref 0–50)
ANION GAP SERPL CALCULATED.3IONS-SCNC: 7 MMOL/L (ref 3–14)
AST SERPL W P-5'-P-CCNC: 36 U/L (ref 0–45)
BILIRUB DIRECT SERPL-MCNC: 0.2 MG/DL (ref 0–0.2)
BILIRUB SERPL-MCNC: 0.9 MG/DL (ref 0.2–1.3)
BUN SERPL-MCNC: 18 MG/DL (ref 7–30)
CALCIUM SERPL-MCNC: 7.7 MG/DL (ref 8.5–10.1)
CHLORIDE SERPL-SCNC: 107 MMOL/L (ref 94–109)
CO2 SERPL-SCNC: 24 MMOL/L (ref 20–32)
CREAT SERPL-MCNC: 0.66 MG/DL (ref 0.52–1.04)
ERYTHROCYTE [DISTWIDTH] IN BLOOD BY AUTOMATED COUNT: 13.3 % (ref 10–15)
GFR SERPL CREATININE-BSD FRML MDRD: >90 ML/MIN/{1.73_M2}
GLUCOSE SERPL-MCNC: 139 MG/DL (ref 70–99)
HCT VFR BLD AUTO: 40.3 % (ref 35–47)
HGB BLD-MCNC: 13.4 G/DL (ref 11.7–15.7)
INR PPP: 1.15 (ref 0.86–1.14)
MCH RBC QN AUTO: 29.2 PG (ref 26.5–33)
MCHC RBC AUTO-ENTMCNC: 33.3 G/DL (ref 31.5–36.5)
MCV RBC AUTO: 88 FL (ref 78–100)
PLATELET # BLD AUTO: 77 10E9/L (ref 150–450)
POTASSIUM SERPL-SCNC: 4.4 MMOL/L (ref 3.4–5.3)
PROT SERPL-MCNC: 6.5 G/DL (ref 6.8–8.8)
RBC # BLD AUTO: 4.59 10E12/L (ref 3.8–5.2)
SODIUM SERPL-SCNC: 138 MMOL/L (ref 133–144)
WBC # BLD AUTO: 2.7 10E9/L (ref 4–11)

## 2020-11-19 PROCEDURE — 36415 COLL VENOUS BLD VENIPUNCTURE: CPT | Performed by: PATHOLOGY

## 2020-11-19 PROCEDURE — 99214 OFFICE O/P EST MOD 30 MIN: CPT | Mod: 95 | Performed by: INTERNAL MEDICINE

## 2020-11-19 PROCEDURE — 80076 HEPATIC FUNCTION PANEL: CPT | Performed by: PATHOLOGY

## 2020-11-19 PROCEDURE — 999N001193 HC VIDEO/TELEPHONE VISIT; NO CHARGE

## 2020-11-19 PROCEDURE — 82105 ALPHA-FETOPROTEIN SERUM: CPT | Performed by: PATHOLOGY

## 2020-11-19 PROCEDURE — 76700 US EXAM ABDOM COMPLETE: CPT | Mod: GC | Performed by: RADIOLOGY

## 2020-11-19 PROCEDURE — 85610 PROTHROMBIN TIME: CPT | Performed by: PATHOLOGY

## 2020-11-19 PROCEDURE — 80048 BASIC METABOLIC PNL TOTAL CA: CPT | Performed by: PATHOLOGY

## 2020-11-19 PROCEDURE — 85027 COMPLETE CBC AUTOMATED: CPT | Performed by: PATHOLOGY

## 2020-11-19 ASSESSMENT — PAIN SCALES - GENERAL: PAINLEVEL: NO PAIN (0)

## 2020-11-19 NOTE — LETTER
"    11/19/2020         RE: Felisa Starks  [address]  [place] [facility] [address]        Dear Colleague,    Thank you for referring your patient, Felisa Starks, to the [facility] [place]. Please see a copy of my visit note below.    Felisa Starks is a 70 year old female who is being evaluated via a billable video visit.      The patient has been notified of following:     \"This video visit will be conducted via a call between you and your physician/provider. We have found that certain health care needs can be provided without the need for an in-person physical exam.  This service lets us provide the care you need with a video conversation.  If a prescription is necessary we can send it directly to your pharmacy.  If lab work is needed we can place an order for that and you can then stop by our lab to have the test done at a later time.    Video visits are billed at different rates depending on your insurance coverage.  Please reach out to your insurance provider with any questions.    If during the course of the call the physician/provider feels a video visit is not appropriate, you will not be charged for this service.\"    Patient has given verbal consent for Video visit? Yes  How would you like to obtain your AVS? Mail a copy  If you are dropped from the video visit, the video invite should be resent to: Send to e-mail at: [email or web address]  Will anyone else be joining your video visit? No      Video-Visit Details     I had the pleasure of seeing Felisa Starks for follow-up via video visit in the Liver Clinic at the [facility] on 11/19/2020.  Ms. Starks returns for followup of cirrhosis caused by chronic hepatitis C.  She is a sustained virologic responder to hepatitis C treatment.      She is doing well at this visit.  She denies any abdominal pain, itching or skin rash or fatigue.  She is still busy with 2 jobs.  She denies any increased abdominal girth or lower " extremity edema.  She has not had any gastrointestinal bleeding or any overt signs of hepatic encephalopathy.     She denies any fevers or chills.  She does have some shortness of breath but denies any cough.  She denies any nausea or vomiting.  She does have chronic loose bowel movements.  She does use Imodium which certainly does work.  . Her appetite has been good, and her weight is  up by 5 or 10 pounds.     Current Outpatient Medications   Medication     albuterol (PROAIR HFA/PROVENTIL HFA/VENTOLIN HFA) 108 (90 Base) MCG/ACT inhaler     Ascorbic Acid (VITAMIN C) 500 MG CHEW     Calcium Carb-Cholecalciferol (CALCIUM 500 + D3 PO)     ibuprofen (ADVIL/MOTRIN) 200 MG tablet     loperamide (IMODIUM A-D) 2 MG tablet     Multiple Vitamins-Minerals (MULTIVITAMIN ADULT PO)     budesonide-formoterol (SYMBICORT) 160-4.5 MCG/ACT Inhaler     fluticasone (FLOVENT HFA) 220 MCG/ACT Inhaler     No current facility-administered medications for this visit.      On physical examination, she looks well.  HEENT exam shows no scleral icterus or temporal muscle wasting.  Neurologic exam shows no asterixis.    Recent Results (from the past 168 hour(s))   CBC with platelets    Collection Time: 11/19/20  6:24 AM   Result Value Ref Range    WBC 2.7 (L) 4.0 - 11.0 10e9/L    RBC Count 4.59 3.8 - 5.2 10e12/L    Hemoglobin 13.4 11.7 - 15.7 g/dL    Hematocrit 40.3 35.0 - 47.0 %    MCV 88 78 - 100 fl    MCH 29.2 26.5 - 33.0 pg    MCHC 33.3 31.5 - 36.5 g/dL    RDW 13.3 10.0 - 15.0 %    Platelet Count 77 (L) 150 - 450 10e9/L   Basic metabolic panel    Collection Time: 11/19/20  6:24 AM   Result Value Ref Range    Sodium 138 133 - 144 mmol/L    Potassium 4.4 3.4 - 5.3 mmol/L    Chloride 107 94 - 109 mmol/L    Carbon Dioxide 24 20 - 32 mmol/L    Anion Gap 7 3 - 14 mmol/L    Glucose 139 (H) 70 - 99 mg/dL    Urea Nitrogen 18 7 - 30 mg/dL    Creatinine 0.66 0.52 - 1.04 mg/dL    GFR Estimate >90 >60 mL/min/[1.73_m2]    GFR Estimate If Black >90 >60  mL/min/[1.73_m2]    Calcium 7.7 (L) 8.5 - 10.1 mg/dL   Hepatic panel    Collection Time: 11/19/20  6:24 AM   Result Value Ref Range    Bilirubin Direct 0.2 0.0 - 0.2 mg/dL    Bilirubin Total 0.9 0.2 - 1.3 mg/dL    Albumin 3.6 3.4 - 5.0 g/dL    Protein Total 6.5 (L) 6.8 - 8.8 g/dL    Alkaline Phosphatase 78 40 - 150 U/L    ALT 53 (H) 0 - 50 U/L    AST 36 0 - 45 U/L   INR    Collection Time: 11/19/20  6:24 AM   Result Value Ref Range    INR 1.15 (H) 0.86 - 1.14      Exam: US ABDOMEN COMPLETE, 11/19/2020 7:55 AM     Indication: Patient at high risk for HCC. Cirrhosis of liver without ascites, unspecified hepatic cirrhosis type (H)     Comparison: 11/19/2019     Technique: The abdomen was scanned in the standard fashion with specialized ultrasound transducer(s) using both gray scale and limited color/spectral Doppler techniques.     Findings:     Liver:     The liver demonstrates coarsened echotexture and surface nodularity.  No mass or intrahepatic biliary ductal dilatation. The main portal vein is patent with antegrade flow.     US visualization score: A - No or minimal limitations     Gallbladder: The gallbladder is well distended and of normal  morphology. Shadowing gallstones are present. There is no wall thickening, pericholecystic fluid or sonographic Koch's sign.     Bile Ducts: Both the intra- and extrahepatic biliary system are of normal caliber. The common bile duct measures 3 mm in diameter.     Pancreas: Visualized portions of the head and body of the pancreas are unremarkable.      Kidneys: Both kidneys are of normal echotexture, without mass nor hydronephrosis.  The craniocaudal dimensions are: right- 9.5 cm, left 11.0 cm.     Spleen: The spleen is normal in size, measuring 13.6 cm in sagittal dimension.     Aorta and IVC: The visualized portions of the aorta and IVC are unremarkable. The aorta measures 1.9 cm in diameter and the IVC measures 1.0 cm in diameter.     Fluid: No evidence of ascites or  pleural effusions.                                                                      Impression:  1. Cirrhosis without focal liver lesion.  a. LI-RADS US Category: US-1 Negative: No US evidence of HCC  b. Recommend continued surveillance US.  2. Cholelithiasis without sonographic evidence for acute  cholecystitis.  3. Splenomegaly.    My impression is Ms. Starks has well-compensated cirrhosis caused by chronic hepatitis C for which she is a sustained virologic responder.  She is up to date with regard to vaccines.  She did decline a flu vaccine.  She is up to date with regard to variceal screening and colorectal cancer screening as well.      Her liver and kidney function are both excellent. My plan will be to see the patient back in the clinic for repeat imaging and blood work in 6 months.     We did discuss the fact that she is at increased risk for more severe COVID-19.  She is taking necessary precautions and I strongly advised her to consider the vaccine when it does come available.     Thank you very much for allowing me to participate in the care of this patient.  If you have any questions regarding my recommendations, please do not hesitate to contact me.         Abbe Reynolds MD      Professor of Medicine  [employer]      Executive Medical Director, Solid Organ Transplant Program  [facility]    Type of service:  Video Visit    Video Start Time: 9:30 AM  Video End Time: 9:45 AM    Originating Location (pt. Location): Clinic    Distant Location (provider location):  [facility]PAT[facility] [place]     Platform used for Video Visit: [employer]            Again, thank you for allowing me to participate in the care of your patient.        Sincerely,        Abbe Reynolds MD

## 2020-11-19 NOTE — PROGRESS NOTES
"Felisa Starks is a 70 year old female who is being evaluated via a billable video visit.      The patient has been notified of following:     \"This video visit will be conducted via a call between you and your physician/provider. We have found that certain health care needs can be provided without the need for an in-person physical exam.  This service lets us provide the care you need with a video conversation.  If a prescription is necessary we can send it directly to your pharmacy.  If lab work is needed we can place an order for that and you can then stop by our lab to have the test done at a later time.    Video visits are billed at different rates depending on your insurance coverage.  Please reach out to your insurance provider with any questions.    If during the course of the call the physician/provider feels a video visit is not appropriate, you will not be charged for this service.\"    Patient has given verbal consent for Video visit? Yes  How would you like to obtain your AVS? Mail a copy  If you are dropped from the video visit, the video invite should be resent to: Send to e-mail at: [email or web address]  Will anyone else be joining your video visit? No      Video-Visit Details     I had the pleasure of seeing Felisa Starks for follow-up via video visit in the Liver Clinic at the [facility] on 11/19/2020.  Ms. Starks returns for followup of cirrhosis caused by chronic hepatitis C.  She is a sustained virologic responder to hepatitis C treatment.      She is doing well at this visit.  She denies any abdominal pain, itching or skin rash or fatigue.  She is still busy with 2 jobs.  She denies any increased abdominal girth or lower extremity edema.  She has not had any gastrointestinal bleeding or any overt signs of hepatic encephalopathy.     She denies any fevers or chills.  She does have some shortness of breath but denies any cough.  She denies any nausea or vomiting.  She does " have chronic loose bowel movements.  She does use Imodium which certainly does work.  . Her appetite has been good, and her weight is  up by 5 or 10 pounds.     Current Outpatient Medications   Medication     albuterol (PROAIR HFA/PROVENTIL HFA/VENTOLIN HFA) 108 (90 Base) MCG/ACT inhaler     Ascorbic Acid (VITAMIN C) 500 MG CHEW     Calcium Carb-Cholecalciferol (CALCIUM 500 + D3 PO)     ibuprofen (ADVIL/MOTRIN) 200 MG tablet     loperamide (IMODIUM A-D) 2 MG tablet     Multiple Vitamins-Minerals (MULTIVITAMIN ADULT PO)     budesonide-formoterol (SYMBICORT) 160-4.5 MCG/ACT Inhaler     fluticasone (FLOVENT HFA) 220 MCG/ACT Inhaler     No current facility-administered medications for this visit.      On physical examination, she looks well.  HEENT exam shows no scleral icterus or temporal muscle wasting.  Neurologic exam shows no asterixis.    Recent Results (from the past 168 hour(s))   CBC with platelets    Collection Time: 11/19/20  6:24 AM   Result Value Ref Range    WBC 2.7 (L) 4.0 - 11.0 10e9/L    RBC Count 4.59 3.8 - 5.2 10e12/L    Hemoglobin 13.4 11.7 - 15.7 g/dL    Hematocrit 40.3 35.0 - 47.0 %    MCV 88 78 - 100 fl    MCH 29.2 26.5 - 33.0 pg    MCHC 33.3 31.5 - 36.5 g/dL    RDW 13.3 10.0 - 15.0 %    Platelet Count 77 (L) 150 - 450 10e9/L   Basic metabolic panel    Collection Time: 11/19/20  6:24 AM   Result Value Ref Range    Sodium 138 133 - 144 mmol/L    Potassium 4.4 3.4 - 5.3 mmol/L    Chloride 107 94 - 109 mmol/L    Carbon Dioxide 24 20 - 32 mmol/L    Anion Gap 7 3 - 14 mmol/L    Glucose 139 (H) 70 - 99 mg/dL    Urea Nitrogen 18 7 - 30 mg/dL    Creatinine 0.66 0.52 - 1.04 mg/dL    GFR Estimate >90 >60 mL/min/[1.73_m2]    GFR Estimate If Black >90 >60 mL/min/[1.73_m2]    Calcium 7.7 (L) 8.5 - 10.1 mg/dL   Hepatic panel    Collection Time: 11/19/20  6:24 AM   Result Value Ref Range    Bilirubin Direct 0.2 0.0 - 0.2 mg/dL    Bilirubin Total 0.9 0.2 - 1.3 mg/dL    Albumin 3.6 3.4 - 5.0 g/dL    Protein Total  6.5 (L) 6.8 - 8.8 g/dL    Alkaline Phosphatase 78 40 - 150 U/L    ALT 53 (H) 0 - 50 U/L    AST 36 0 - 45 U/L   INR    Collection Time: 11/19/20  6:24 AM   Result Value Ref Range    INR 1.15 (H) 0.86 - 1.14      Exam: US ABDOMEN COMPLETE, 11/19/2020 7:55 AM     Indication: Patient at high risk for HCC. Cirrhosis of liver without ascites, unspecified hepatic cirrhosis type (H)     Comparison: 11/19/2019     Technique: The abdomen was scanned in the standard fashion with specialized ultrasound transducer(s) using both gray scale and limited color/spectral Doppler techniques.     Findings:     Liver:     The liver demonstrates coarsened echotexture and surface nodularity.  No mass or intrahepatic biliary ductal dilatation. The main portal vein is patent with antegrade flow.     US visualization score: A - No or minimal limitations     Gallbladder: The gallbladder is well distended and of normal  morphology. Shadowing gallstones are present. There is no wall thickening, pericholecystic fluid or sonographic Koch's sign.     Bile Ducts: Both the intra- and extrahepatic biliary system are of normal caliber. The common bile duct measures 3 mm in diameter.     Pancreas: Visualized portions of the head and body of the pancreas are unremarkable.      Kidneys: Both kidneys are of normal echotexture, without mass nor hydronephrosis.  The craniocaudal dimensions are: right- 9.5 cm, left 11.0 cm.     Spleen: The spleen is normal in size, measuring 13.6 cm in sagittal dimension.     Aorta and IVC: The visualized portions of the aorta and IVC are unremarkable. The aorta measures 1.9 cm in diameter and the IVC measures 1.0 cm in diameter.     Fluid: No evidence of ascites or pleural effusions.                                                                      Impression:  1. Cirrhosis without focal liver lesion.  a. LI-RADS US Category: US-1 Negative: No US evidence of HCC  b. Recommend continued surveillance US.  2.  Cholelithiasis without sonographic evidence for acute  cholecystitis.  3. Splenomegaly.    My impression is Ms. Starks has well-compensated cirrhosis caused by chronic hepatitis C for which she is a sustained virologic responder.  She is up to date with regard to vaccines.  She did decline a flu vaccine.  She is up to date with regard to variceal screening and colorectal cancer screening as well.      Her liver and kidney function are both excellent. My plan will be to see the patient back in the clinic for repeat imaging and blood work in 6 months.     We did discuss the fact that she is at increased risk for more severe COVID-19.  She is taking necessary precautions and I strongly advised her to consider the vaccine when it does come available.     Thank you very much for allowing me to participate in the care of this patient.  If you have any questions regarding my recommendations, please do not hesitate to contact me.         Abbe Reynolds MD      Professor of Medicine  [employer]      Executive Medical Director, Solid Organ Transplant Program  [facility]    Type of service:  Video Visit    Video Start Time: 9:30 AM  Video End Time: 9:45 AM    Originating Location (pt. Location): Clinic    Distant Location (provider location):  [facility]PATOLOGY [facility] [place]     Platform used for Video Visit: [employer]

## 2021-03-28 ENCOUNTER — HEALTH MAINTENANCE LETTER (OUTPATIENT)
Age: 71
End: 2021-03-28

## 2021-05-20 ENCOUNTER — OFFICE VISIT (OUTPATIENT)
Dept: GASTROENTEROLOGY | Facility: CLINIC | Age: 71
End: 2021-05-20
Attending: INTERNAL MEDICINE
Payer: MEDICARE

## 2021-05-20 ENCOUNTER — ANCILLARY PROCEDURE (OUTPATIENT)
Dept: ULTRASOUND IMAGING | Facility: CLINIC | Age: 71
End: 2021-05-20
Attending: INTERNAL MEDICINE
Payer: MEDICARE

## 2021-05-20 VITALS
DIASTOLIC BLOOD PRESSURE: 88 MMHG | OXYGEN SATURATION: 97 % | WEIGHT: 166.3 LBS | SYSTOLIC BLOOD PRESSURE: 150 MMHG | BODY MASS INDEX: 29.46 KG/M2 | HEART RATE: 68 BPM

## 2021-05-20 DIAGNOSIS — Z79.4 TYPE 2 DIABETES MELLITUS WITH CHRONIC KIDNEY DISEASE, WITH LONG-TERM CURRENT USE OF INSULIN, UNSPECIFIED CKD STAGE (H): ICD-10-CM

## 2021-05-20 DIAGNOSIS — D61.818 ACQUIRED PANCYTOPENIA (H): ICD-10-CM

## 2021-05-20 DIAGNOSIS — K74.60 CIRRHOSIS OF LIVER WITHOUT ASCITES, UNSPECIFIED HEPATIC CIRRHOSIS TYPE (H): ICD-10-CM

## 2021-05-20 DIAGNOSIS — J43.1 PANLOBULAR EMPHYSEMA (H): ICD-10-CM

## 2021-05-20 DIAGNOSIS — E11.22 TYPE 2 DIABETES MELLITUS WITH CHRONIC KIDNEY DISEASE, WITH LONG-TERM CURRENT USE OF INSULIN, UNSPECIFIED CKD STAGE (H): ICD-10-CM

## 2021-05-20 DIAGNOSIS — I85.00 ESOPHAGEAL VARICES WITHOUT BLEEDING, UNSPECIFIED ESOPHAGEAL VARICES TYPE (H): ICD-10-CM

## 2021-05-20 DIAGNOSIS — K74.60 CIRRHOSIS OF LIVER WITHOUT ASCITES, UNSPECIFIED HEPATIC CIRRHOSIS TYPE (H): Primary | ICD-10-CM

## 2021-05-20 LAB
AFP SERPL-MCNC: 1.5 UG/L (ref 0–8)
ALBUMIN SERPL-MCNC: 3.4 G/DL (ref 3.4–5)
ALP SERPL-CCNC: 93 U/L (ref 40–150)
ALT SERPL W P-5'-P-CCNC: 50 U/L (ref 0–50)
ANION GAP SERPL CALCULATED.3IONS-SCNC: <1 MMOL/L (ref 3–14)
AST SERPL W P-5'-P-CCNC: 35 U/L (ref 0–45)
BILIRUB DIRECT SERPL-MCNC: 0.3 MG/DL (ref 0–0.2)
BILIRUB SERPL-MCNC: 0.7 MG/DL (ref 0.2–1.3)
BUN SERPL-MCNC: 17 MG/DL (ref 7–30)
CALCIUM SERPL-MCNC: 8.6 MG/DL (ref 8.5–10.1)
CHLORIDE SERPL-SCNC: 113 MMOL/L (ref 94–109)
CO2 SERPL-SCNC: 28 MMOL/L (ref 20–32)
CREAT SERPL-MCNC: 0.66 MG/DL (ref 0.52–1.04)
ERYTHROCYTE [DISTWIDTH] IN BLOOD BY AUTOMATED COUNT: 13.2 % (ref 10–15)
GFR SERPL CREATININE-BSD FRML MDRD: >90 ML/MIN/{1.73_M2}
GLUCOSE SERPL-MCNC: 158 MG/DL (ref 70–99)
HBA1C MFR BLD: 6.5 % (ref 0–5.6)
HCT VFR BLD AUTO: 37.3 % (ref 35–47)
HGB BLD-MCNC: 12.4 G/DL (ref 11.7–15.7)
INR PPP: 1.15 (ref 0.86–1.14)
MCH RBC QN AUTO: 29 PG (ref 26.5–33)
MCHC RBC AUTO-ENTMCNC: 33.2 G/DL (ref 31.5–36.5)
MCV RBC AUTO: 87 FL (ref 78–100)
PLATELET # BLD AUTO: 82 10E9/L (ref 150–450)
POTASSIUM SERPL-SCNC: 4 MMOL/L (ref 3.4–5.3)
PROT SERPL-MCNC: 6.1 G/DL (ref 6.8–8.8)
RBC # BLD AUTO: 4.28 10E12/L (ref 3.8–5.2)
SODIUM SERPL-SCNC: 141 MMOL/L (ref 133–144)
WBC # BLD AUTO: 2.5 10E9/L (ref 4–11)

## 2021-05-20 PROCEDURE — 80076 HEPATIC FUNCTION PANEL: CPT | Performed by: PATHOLOGY

## 2021-05-20 PROCEDURE — 36415 COLL VENOUS BLD VENIPUNCTURE: CPT | Performed by: PATHOLOGY

## 2021-05-20 PROCEDURE — 82105 ALPHA-FETOPROTEIN SERUM: CPT | Performed by: INTERNAL MEDICINE

## 2021-05-20 PROCEDURE — 80048 BASIC METABOLIC PNL TOTAL CA: CPT | Performed by: PATHOLOGY

## 2021-05-20 PROCEDURE — 85027 COMPLETE CBC AUTOMATED: CPT | Performed by: PATHOLOGY

## 2021-05-20 PROCEDURE — 99213 OFFICE O/P EST LOW 20 MIN: CPT | Performed by: INTERNAL MEDICINE

## 2021-05-20 PROCEDURE — 85610 PROTHROMBIN TIME: CPT | Performed by: PATHOLOGY

## 2021-05-20 PROCEDURE — 76700 US EXAM ABDOM COMPLETE: CPT | Mod: GC | Performed by: RADIOLOGY

## 2021-05-20 PROCEDURE — 83036 HEMOGLOBIN GLYCOSYLATED A1C: CPT | Performed by: PATHOLOGY

## 2021-05-20 ASSESSMENT — PAIN SCALES - GENERAL: PAINLEVEL: NO PAIN (0)

## 2021-05-20 NOTE — NURSING NOTE
Chief Complaint   Patient presents with     RECHECK     6 month f/u     Blood pressure (!) 150/88, pulse 68, weight 75.4 kg (166 lb 4.8 oz), SpO2 97 %, not currently breastfeeding.    Martha Weiss, CMA

## 2021-05-20 NOTE — LETTER
5/20/2021         RE: Felisa Starks  [address]  [facility] [address]        Dear Colleague,    Thank you for referring your patient, Felisa Starks, to the [facility] [place]. Please see a copy of my visit note below.    I had the pleasure of seeing Felisa Starks for followup in the Liver Clinic at the [facility] on 05/20/2021.  Ms. Starks returns for followup of cirrhosis caused by chronic hepatitis C.  She is a sustained virologic responder to hepatitis C treatment.    She is doing well at this visit.  She denies any abdominal pain, itching, skin rash or fatigue.  She denies any increased abdominal girth or lower extremity edema.  She denies any gastrointestinal bleeding or any overt signs of hepatic encephalopathy.    She denies any fevers, chills, cough or shortness of breath.  She has not yet been vaccinated against COVID-19.  She denies any nausea or vomiting.  She is having 1-2 bowel movements per day.  Her appetite has been good, and her weight is down 1 pound.    Otherwise, there have been no other new events since she was last seen.    Current Outpatient Medications   Medication     albuterol (PROAIR HFA/PROVENTIL HFA/VENTOLIN HFA) 108 (90 Base) MCG/ACT inhaler     Ascorbic Acid (VITAMIN C) 500 MG CHEW     budesonide-formoterol (SYMBICORT) 160-4.5 MCG/ACT Inhaler     Calcium Carb-Cholecalciferol (CALCIUM 500 + D3 PO)     ibuprofen (ADVIL/MOTRIN) 200 MG tablet     Multiple Vitamins-Minerals (MULTIVITAMIN ADULT PO)     fluticasone (FLOVENT HFA) 220 MCG/ACT Inhaler     loperamide (IMODIUM A-D) 2 MG tablet     No current facility-administered medications for this visit.      BP (!) 150/88   Pulse 68   Wt 75.4 kg (166 lb 4.8 oz)   SpO2 97%   BMI 29.46 kg/m      PHYSICAL EXAMINATION:    GENERAL:  She looks quite well.  HEENT:  No scleral icterus or temporal muscle wasting.    CHEST:  Clear.    ABDOMEN:  No increase in girth.  No masses or tenderness  to palpation is present.  Her liver is 10 cm in span without left lobe enlargement.  No spleen tip is palpable.    EXTREMITIES:  No edema.    SKIN:  Some palmar erythema without spider angioma.    NEUROLOGIC:  No asterixis.    Recent Results (from the past 168 hour(s))   AFP tumor marker [[id number]]    Collection Time: 05/20/21  6:19 AM   Result Value Ref Range    Alpha Fetoprotein 1.5 0 - 8 ug/L   INR [UGK3480]    Collection Time: 05/20/21  6:19 AM   Result Value Ref Range    INR 1.15 (H) 0.86 - 1.14   CBC with platelets [[id number]]    Collection Time: 05/20/21  6:19 AM   Result Value Ref Range    WBC 2.5 (L) 4.0 - 11.0 10e9/L    RBC Count 4.28 3.8 - 5.2 10e12/L    Hemoglobin 12.4 11.7 - 15.7 g/dL    Hematocrit 37.3 35.0 - 47.0 %    MCV 87 78 - 100 fl    MCH 29.0 26.5 - 33.0 pg    MCHC 33.2 31.5 - 36.5 g/dL    RDW 13.2 10.0 - 15.0 %    Platelet Count 82 (L) 150 - 450 10e9/L   Basic metabolic panel [LAB15]    Collection Time: 05/20/21  6:19 AM   Result Value Ref Range    Sodium 141 133 - 144 mmol/L    Potassium 4.0 3.4 - 5.3 mmol/L    Chloride 113 (H) 94 - 109 mmol/L    Carbon Dioxide 28 20 - 32 mmol/L    Anion Gap <1 (L) 3 - 14 mmol/L    Glucose 158 (H) 70 - 99 mg/dL    Urea Nitrogen 17 7 - 30 mg/dL    Creatinine 0.66 0.52 - 1.04 mg/dL    GFR Estimate >90 >60 mL/min/[1.73_m2]    GFR Estimate If Black >90 >60 mL/min/[1.73_m2]    Calcium 8.6 8.5 - 10.1 mg/dL   Hepatic Panel [LAB20]    Collection Time: 05/20/21  6:19 AM   Result Value Ref Range    Bilirubin Direct 0.3 (H) 0.0 - 0.2 mg/dL    Bilirubin Total 0.7 0.2 - 1.3 mg/dL    Albumin 3.4 3.4 - 5.0 g/dL    Protein Total 6.1 (L) 6.8 - 8.8 g/dL    Alkaline Phosphatase 93 40 - 150 U/L    ALT 50 0 - 50 U/L    AST 35 0 - 45 U/L      Exam: US ABDOMEN COMPLETE, 5/20/2021 8:20 AM     Indication: Patient at high risk for HCC. Cirrhosis of liver without ascites, unspecified hepatic cirrhosis type (H)     Comparison: Abdominal ultrasound 11/19/2020     Technique: The abdomen was  scanned in the standard fashion with specialized ultrasound transducer(s) using both gray scale and limited color/spectral Doppler techniques.     Findings:     Liver:     The liver measures 16.5 cm and demonstrates coarsened echotexture with nodular hepatic contour. No mass or intrahepatic biliary ductal dilatation. The main portal vein is patent with antegrade flow.     US visualization score: A - No or minimal limitations     Gallbladder: Borderline gallbladder wall thickening measuring 3 mm. Multiple mobile layering shadowing gallstones. No pericholecystic fluid or gallbladder wall hyperemia. The sonographic Koch sign is negative.     Bile Ducts: Both the intra- and extrahepatic biliary system are of normal caliber. The common bile duct measures 4 mm in diameter.     Pancreas: Visualized portions of the head and body of the pancreas are unremarkable.      Kidneys: Both kidneys are of normal echotexture, without mass nor hydronephrosis.  The craniocaudal dimensions are: right- 9.9 cm, left- 10.5 cm.     Spleen: Enlarged measuring 16.2 cm in sagittal dimension.     Aorta and IVC: The visualized portions of the aorta and IVC are unremarkable. The aorta measures 2.4 cm in diameter and the IVC measures 1.6 cm in diameter.     Fluid: No evidence of ascites or pleural effusions.                                                                 Impression:  1. Cirrhosis without focal liver lesion.  a. LI-RADS US Category: US-1 Negative: No US evidence of HCC.  b. Recommend continued surveillance US.  2. Cholelithiasis without evidence for acute cholecystitis.  3. Splenomegaly, increased since the most recent comparison currently 16.2 cm, previously 13.6 cm.    IMPRESSION:  Ms. Starks has cirrhosis caused by chronic hepatitis C and is doing extremely well.  Her liver disease is very well compensated.  Ultrasound shows no mass lesions and no ascites.  She will get the COVID-19 vaccine after I pointed out to her that  patients with cirrhosis have a substantially higher risk of mortality with COVID-19 and also discussed the safety and efficacy of the vaccine..    She is otherwise up to date with regard to other vaccines and cancer screening and I will see her back in the clinic in 6 months for repeat imaging and blood work.    Thank you very much for allowing me to participate in the care of this patient.  If you have any questions regarding my recommendations, please do not hesitate to contact me.         Abbe Reynolds MD      Professor of Medicine  [employer]      Executive Medical Director, Solid Organ Transplant Program  [facility] .

## 2021-05-20 NOTE — PROGRESS NOTES
I had the pleasure of seeing Felisa Starks for followup in the Liver Clinic at the [facility] on 05/20/2021.  Ms. Starks returns for followup of cirrhosis caused by chronic hepatitis C.  She is a sustained virologic responder to hepatitis C treatment.    She is doing well at this visit.  She denies any abdominal pain, itching, skin rash or fatigue.  She denies any increased abdominal girth or lower extremity edema.  She denies any gastrointestinal bleeding or any overt signs of hepatic encephalopathy.    She denies any fevers, chills, cough or shortness of breath.  She has not yet been vaccinated against COVID-19.  She denies any nausea or vomiting.  She is having 1-2 bowel movements per day.  Her appetite has been good, and her weight is down 1 pound.    Otherwise, there have been no other new events since she was last seen.    Current Outpatient Medications   Medication     albuterol (PROAIR HFA/PROVENTIL HFA/VENTOLIN HFA) 108 (90 Base) MCG/ACT inhaler     Ascorbic Acid (VITAMIN C) 500 MG CHEW     budesonide-formoterol (SYMBICORT) 160-4.5 MCG/ACT Inhaler     Calcium Carb-Cholecalciferol (CALCIUM 500 + D3 PO)     ibuprofen (ADVIL/MOTRIN) 200 MG tablet     Multiple Vitamins-Minerals (MULTIVITAMIN ADULT PO)     fluticasone (FLOVENT HFA) 220 MCG/ACT Inhaler     loperamide (IMODIUM A-D) 2 MG tablet     No current facility-administered medications for this visit.      BP (!) 150/88   Pulse 68   Wt 75.4 kg (166 lb 4.8 oz)   SpO2 97%   BMI 29.46 kg/m      PHYSICAL EXAMINATION:    GENERAL:  She looks quite well.  HEENT:  No scleral icterus or temporal muscle wasting.    CHEST:  Clear.    ABDOMEN:  No increase in girth.  No masses or tenderness to palpation is present.  Her liver is 10 cm in span without left lobe enlargement.  No spleen tip is palpable.    EXTREMITIES:  No edema.    SKIN:  Some palmar erythema without spider angioma.    NEUROLOGIC:  No asterixis.    Recent Results (from the  past 168 hour(s))   AFP tumor marker [[id number]]    Collection Time: 05/20/21  6:19 AM   Result Value Ref Range    Alpha Fetoprotein 1.5 0 - 8 ug/L   INR [[id number]]    Collection Time: 05/20/21  6:19 AM   Result Value Ref Range    INR 1.15 (H) 0.86 - 1.14   CBC with platelets [[id number]]    Collection Time: 05/20/21  6:19 AM   Result Value Ref Range    WBC 2.5 (L) 4.0 - 11.0 10e9/L    RBC Count 4.28 3.8 - 5.2 10e12/L    Hemoglobin 12.4 11.7 - 15.7 g/dL    Hematocrit 37.3 35.0 - 47.0 %    MCV 87 78 - 100 fl    MCH 29.0 26.5 - 33.0 pg    MCHC 33.2 31.5 - 36.5 g/dL    RDW 13.2 10.0 - 15.0 %    Platelet Count 82 (L) 150 - 450 10e9/L   Basic metabolic panel [LAB15]    Collection Time: 05/20/21  6:19 AM   Result Value Ref Range    Sodium 141 133 - 144 mmol/L    Potassium 4.0 3.4 - 5.3 mmol/L    Chloride 113 (H) 94 - 109 mmol/L    Carbon Dioxide 28 20 - 32 mmol/L    Anion Gap <1 (L) 3 - 14 mmol/L    Glucose 158 (H) 70 - 99 mg/dL    Urea Nitrogen 17 7 - 30 mg/dL    Creatinine 0.66 0.52 - 1.04 mg/dL    GFR Estimate >90 >60 mL/min/[1.73_m2]    GFR Estimate If Black >90 >60 mL/min/[1.73_m2]    Calcium 8.6 8.5 - 10.1 mg/dL   Hepatic Panel [LAB20]    Collection Time: 05/20/21  6:19 AM   Result Value Ref Range    Bilirubin Direct 0.3 (H) 0.0 - 0.2 mg/dL    Bilirubin Total 0.7 0.2 - 1.3 mg/dL    Albumin 3.4 3.4 - 5.0 g/dL    Protein Total 6.1 (L) 6.8 - 8.8 g/dL    Alkaline Phosphatase 93 40 - 150 U/L    ALT 50 0 - 50 U/L    AST 35 0 - 45 U/L      Exam: US ABDOMEN COMPLETE, 5/20/2021 8:20 AM     Indication: Patient at high risk for HCC. Cirrhosis of liver without ascites, unspecified hepatic cirrhosis type (H)     Comparison: Abdominal ultrasound 11/19/2020     Technique: The abdomen was scanned in the standard fashion with specialized ultrasound transducer(s) using both gray scale and limited color/spectral Doppler techniques.     Findings:     Liver:     The liver measures 16.5 cm and demonstrates coarsened echotexture with nodular  hepatic contour. No mass or intrahepatic biliary ductal dilatation. The main portal vein is patent with antegrade flow.     US visualization score: A - No or minimal limitations     Gallbladder: Borderline gallbladder wall thickening measuring 3 mm. Multiple mobile layering shadowing gallstones. No pericholecystic fluid or gallbladder wall hyperemia. The sonographic Koch sign is negative.     Bile Ducts: Both the intra- and extrahepatic biliary system are of normal caliber. The common bile duct measures 4 mm in diameter.     Pancreas: Visualized portions of the head and body of the pancreas are unremarkable.      Kidneys: Both kidneys are of normal echotexture, without mass nor hydronephrosis.  The craniocaudal dimensions are: right- 9.9 cm, left- 10.5 cm.     Spleen: Enlarged measuring 16.2 cm in sagittal dimension.     Aorta and IVC: The visualized portions of the aorta and IVC are unremarkable. The aorta measures 2.4 cm in diameter and the IVC measures 1.6 cm in diameter.     Fluid: No evidence of ascites or pleural effusions.                                                                 Impression:  1. Cirrhosis without focal liver lesion.  a. LI-RADS US Category: US-1 Negative: No US evidence of HCC.  b. Recommend continued surveillance US.  2. Cholelithiasis without evidence for acute cholecystitis.  3. Splenomegaly, increased since the most recent comparison currently 16.2 cm, previously 13.6 cm.    IMPRESSION:  Ms. Starks has cirrhosis caused by chronic hepatitis C and is doing extremely well.  Her liver disease is very well compensated.  Ultrasound shows no mass lesions and no ascites.  She will get the COVID-19 vaccine after I pointed out to her that patients with cirrhosis have a substantially higher risk of mortality with COVID-19 and also discussed the safety and efficacy of the vaccine..    She is otherwise up to date with regard to other vaccines and cancer screening and I will see her back in  the clinic in 6 months for repeat imaging and blood work.    Thank you very much for allowing me to participate in the care of this patient.  If you have any questions regarding my recommendations, please do not hesitate to contact me.         Abbe Reynolds MD      Professor of Medicine  [employer]      Executive Medical Director, Solid Organ Transplant Program  [facility] .

## 2021-09-12 ENCOUNTER — HEALTH MAINTENANCE LETTER (OUTPATIENT)
Age: 71
End: 2021-09-12

## 2021-11-07 ENCOUNTER — HEALTH MAINTENANCE LETTER (OUTPATIENT)
Age: 71
End: 2021-11-07

## 2021-11-18 ENCOUNTER — ANCILLARY PROCEDURE (OUTPATIENT)
Dept: ULTRASOUND IMAGING | Facility: CLINIC | Age: 71
End: 2021-11-18
Attending: INTERNAL MEDICINE
Payer: MEDICARE

## 2021-11-18 ENCOUNTER — LAB (OUTPATIENT)
Dept: LAB | Facility: CLINIC | Age: 71
End: 2021-11-18
Payer: MEDICARE

## 2021-11-18 ENCOUNTER — OFFICE VISIT (OUTPATIENT)
Dept: GASTROENTEROLOGY | Facility: CLINIC | Age: 71
End: 2021-11-18
Attending: INTERNAL MEDICINE
Payer: MEDICARE

## 2021-11-18 VITALS
DIASTOLIC BLOOD PRESSURE: 81 MMHG | BODY MASS INDEX: 28.24 KG/M2 | SYSTOLIC BLOOD PRESSURE: 155 MMHG | HEART RATE: 60 BPM | TEMPERATURE: 97.5 F | RESPIRATION RATE: 18 BRPM | HEIGHT: 63 IN | WEIGHT: 159.4 LBS | OXYGEN SATURATION: 96 %

## 2021-11-18 DIAGNOSIS — K74.60 CIRRHOSIS OF LIVER WITHOUT ASCITES, UNSPECIFIED HEPATIC CIRRHOSIS TYPE (H): ICD-10-CM

## 2021-11-18 DIAGNOSIS — K74.60 CIRRHOSIS OF LIVER WITHOUT ASCITES, UNSPECIFIED HEPATIC CIRRHOSIS TYPE (H): Primary | ICD-10-CM

## 2021-11-18 LAB
AFP SERPL-MCNC: 2.6 UG/L (ref 0–8)
ALBUMIN SERPL-MCNC: 3.6 G/DL (ref 3.4–5)
ALP SERPL-CCNC: 87 U/L (ref 40–150)
ALT SERPL W P-5'-P-CCNC: 62 U/L (ref 0–50)
ANION GAP SERPL CALCULATED.3IONS-SCNC: 7 MMOL/L (ref 3–14)
AST SERPL W P-5'-P-CCNC: 42 U/L (ref 0–45)
BILIRUB DIRECT SERPL-MCNC: 0.3 MG/DL (ref 0–0.2)
BILIRUB SERPL-MCNC: 1 MG/DL (ref 0.2–1.3)
BUN SERPL-MCNC: 16 MG/DL (ref 7–30)
CALCIUM SERPL-MCNC: 8.8 MG/DL (ref 8.5–10.1)
CHLORIDE BLD-SCNC: 112 MMOL/L (ref 94–109)
CO2 SERPL-SCNC: 27 MMOL/L (ref 20–32)
CREAT SERPL-MCNC: 0.74 MG/DL (ref 0.52–1.04)
ERYTHROCYTE [DISTWIDTH] IN BLOOD BY AUTOMATED COUNT: 13.5 % (ref 10–15)
GFR SERPL CREATININE-BSD FRML MDRD: 83 ML/MIN/1.73M2
GLUCOSE BLD-MCNC: 170 MG/DL (ref 70–99)
HCT VFR BLD AUTO: 40.5 % (ref 35–47)
HGB BLD-MCNC: 13.5 G/DL (ref 11.7–15.7)
INR PPP: 1.13 (ref 0.85–1.15)
MCH RBC QN AUTO: 29.1 PG (ref 26.5–33)
MCHC RBC AUTO-ENTMCNC: 33.3 G/DL (ref 31.5–36.5)
MCV RBC AUTO: 87 FL (ref 78–100)
PLATELET # BLD AUTO: 86 10E3/UL (ref 150–450)
POTASSIUM BLD-SCNC: 4.4 MMOL/L (ref 3.4–5.3)
PROT SERPL-MCNC: 6.6 G/DL (ref 6.8–8.8)
RBC # BLD AUTO: 4.64 10E6/UL (ref 3.8–5.2)
SODIUM SERPL-SCNC: 146 MMOL/L (ref 133–144)
WBC # BLD AUTO: 2.8 10E3/UL (ref 4–11)

## 2021-11-18 PROCEDURE — 99214 OFFICE O/P EST MOD 30 MIN: CPT | Performed by: INTERNAL MEDICINE

## 2021-11-18 PROCEDURE — 85610 PROTHROMBIN TIME: CPT | Performed by: PATHOLOGY

## 2021-11-18 PROCEDURE — G0463 HOSPITAL OUTPT CLINIC VISIT: HCPCS

## 2021-11-18 PROCEDURE — 76700 US EXAM ABDOM COMPLETE: CPT | Performed by: RADIOLOGY

## 2021-11-18 PROCEDURE — 36415 COLL VENOUS BLD VENIPUNCTURE: CPT | Performed by: PATHOLOGY

## 2021-11-18 PROCEDURE — 80053 COMPREHEN METABOLIC PANEL: CPT | Performed by: PATHOLOGY

## 2021-11-18 PROCEDURE — 82105 ALPHA-FETOPROTEIN SERUM: CPT | Performed by: INTERNAL MEDICINE

## 2021-11-18 PROCEDURE — 85027 COMPLETE CBC AUTOMATED: CPT | Performed by: PATHOLOGY

## 2021-11-18 PROCEDURE — 82248 BILIRUBIN DIRECT: CPT | Performed by: PATHOLOGY

## 2021-11-18 ASSESSMENT — PAIN SCALES - GENERAL: PAINLEVEL: NO PAIN (0)

## 2021-11-18 ASSESSMENT — MIFFLIN-ST. JEOR: SCORE: 1217.03

## 2021-11-18 NOTE — PROGRESS NOTES
"HISTORY OF PRESENT ILLNESS:  I had the pleasure of seeing Felisa Starks for followup in the Liver Clinic at the [facility] on 11/18/2021.  Ms. Starks returns for followup of cirrhosis, likely caused by nonalcoholic fatty liver disease.  She also has a history of hepatitis C but is a sustained virologic responder to hepatitis C treatment.    For the most part, she is unchanged.  She denies any abdominal pain, itching or skin rash, and she denies any fatigue.  She denies any increased abdominal girth.  She does have some mild lower extremity edema in her left foot only.  She has not had any gastrointestinal bleeding or any overt signs of hepatic encephalopathy.    She denies any fevers or chills, cough or shortness of breath.  She is fully vaccinated against COVID-19.  She denies any nausea or vomiting and has anywhere from 5 to 8 bowel movements a day, which has been a chronic problem for her.  She does use Imodium, which does work, although it can occasionally bind her up.  Her appetite has otherwise been good, and her weight has been stable.      Current Outpatient Medications   Medication     albuterol (PROAIR HFA/PROVENTIL HFA/VENTOLIN HFA) 108 (90 Base) MCG/ACT inhaler     Ascorbic Acid (VITAMIN C) 500 MG CHEW     budesonide-formoterol (SYMBICORT) 160-4.5 MCG/ACT Inhaler     Calcium Carb-Cholecalciferol (CALCIUM 500 + D3 PO)     fluticasone (FLOVENT HFA) 220 MCG/ACT Inhaler     ibuprofen (ADVIL/MOTRIN) 200 MG tablet     loperamide (IMODIUM A-D) 2 MG tablet     Multiple Vitamins-Minerals (MULTIVITAMIN ADULT PO)     No current facility-administered medications for this visit.     BP (!) 155/81 (BP Location: Right arm, Patient Position: Sitting, Cuff Size: Adult Regular)   Pulse 60   Temp 97.5  F (36.4  C) (Oral)   Resp 18   Ht 1.6 m (5' 2.99\")   Wt 72.3 kg (159 lb 6.4 oz)   SpO2 96%   BMI 28.24 kg/m      PHYSICAL EXAMINATION:    GENERAL:  She looks quite well.  HEENT:  No scleral " icterus or temporal muscle wasting.  CHEST:  Clear.  ABDOMEN:  No increase in girth.  No masses or tenderness to palpation are present.  Her liver is 10 cm in span without left lobe enlargement.  No spleen tip is palpable.  EXTREMITIES:  No edema.  SKIN:  No stigmata of chronic liver disease.  NEUROLOGIC:  No asterixis.      Recent Results (from the past 168 hour(s))   Hepatic Panel [LAB20]    Collection Time: 11/18/21  6:27 AM   Result Value Ref Range    Bilirubin Total 1.0 0.2 - 1.3 mg/dL    Bilirubin Direct 0.3 (H) 0.0 - 0.2 mg/dL    Protein Total 6.6 (L) 6.8 - 8.8 g/dL    Albumin 3.6 3.4 - 5.0 g/dL    Alkaline Phosphatase 87 40 - 150 U/L    AST 42 0 - 45 U/L    ALT 62 (H) 0 - 50 U/L   Basic metabolic panel [LAB15]    Collection Time: 11/18/21  6:27 AM   Result Value Ref Range    Sodium 146 (H) 133 - 144 mmol/L    Potassium 4.4 3.4 - 5.3 mmol/L    Chloride 112 (H) 94 - 109 mmol/L    Carbon Dioxide (CO2) 27 20 - 32 mmol/L    Anion Gap 7 3 - 14 mmol/L    Urea Nitrogen 16 7 - 30 mg/dL    Creatinine 0.74 0.52 - 1.04 mg/dL    Calcium 8.8 8.5 - 10.1 mg/dL    Glucose 170 (H) 70 - 99 mg/dL    GFR Estimate 83 >60 mL/min/1.73m2   CBC with platelets [[id number]]    Collection Time: 11/18/21  6:27 AM   Result Value Ref Range    WBC Count 2.8 (L) 4.0 - 11.0 10e3/uL    RBC Count 4.64 3.80 - 5.20 10e6/uL    Hemoglobin 13.5 11.7 - 15.7 g/dL    Hematocrit 40.5 35.0 - 47.0 %    MCV 87 78 - 100 fL    MCH 29.1 26.5 - 33.0 pg    MCHC 33.3 31.5 - 36.5 g/dL    RDW 13.5 10.0 - 15.0 %    Platelet Count 86 (L) 150 - 450 10e3/uL   INR [[id number]]    Collection Time: 11/18/21  6:27 AM   Result Value Ref Range    INR 1.13 0.85 - 1.15      ULTRASOUND ABDOMEN COMPLETE 11/18/2021 6:57 AM     CLINICAL HISTORY: Cirrhosis of liver without ascites, unspecified hepatic cirrhosis type (H).      COMPARISONS: Ultrasound 5/20/2021     REFERRING PROVIDER: MASSIEL GONZALEZ     TECHNIQUE: Abdominal viscera evaluated with grayscale imaging. Splenic and main portal  veins evaluated with color Doppler ultrasound.      Cine clips through the liver were saved in the patient's record.     FINDINGS:  Liver: 15 cm. Increased echotexture and echogenicity. No focal hepatic lesion.     Spleen: 5.4 x 17.6 x 17.7 cm.     Right kidney: 10.0 cm. Normal. No mass, stone, or hydronephrosis.     Left kidney: 11.4 cm. Normal. No mass, stone, or hydronephrosis.     Aorta:        Proximal: 2.3 cm.       Mid: 1.7 cm.       Distal: 2.2 cm.     Inferior vena cava: 1.5 cm.     Main portal vein diameter: 1.2 cm.     Ascites: None     Gallbladder: 3 mm gallbladder wall thickness. No pericholecystic fluid. Shadowing mobile stones. No sonographic Koch's sign elicited.     Common bile duct: 3.7 mm     Pancreas: Partially visualized. No abnormality demonstrated.     Splenic vein: antegrade  Main portal vein: antegrade                                                                      IMPRESSION:  1. Cirrhosis without focal liver lesion.       A. LI-RADS US Category: US-1 Negative: No US evidence of HCC       B. Recommend continued surveillance US.     2. Splenomegaly.     3. Cholelithiasis without evidence for cholecystitis.    IMPRESSION:  My impression is that Ms. Starks has cirrhosis, which is well compensated at this point in time.  Her ultrasound shows no evidence of HCC and all complications are well addressed.  The good news is that she got vaccinated since she was last seen.  I will not be making any change to her medical regimen.  I will see her back in the clinic in 6 months for repeat imaging and blood work.  She is up to date with regard to other vaccines as well, although she does decline a flu vaccine and she is up to date with regard to variceal screening.    Thank you very much for allowing me to participate in the care of this patient.  If you have any questions regarding my recommendations, please do not hesitate to contact me.           Abbe Reynolds MD      Professor of  Medicine  [employer]      Executive Medical Director, Solid Organ Transplant Program  [facility]

## 2021-11-18 NOTE — LETTER
"  11/18/2021     RE: Felisa Starks  [address]  [facility] [address]    HISTORY OF PRESENT ILLNESS:  I had the pleasure of seeing Felisa Starks for followup in the Liver Clinic at the [facility] on 11/18/2021.  Ms. Starks returns for followup of cirrhosis, likely caused by nonalcoholic fatty liver disease.  She also has a history of hepatitis C but is a sustained virologic responder to hepatitis C treatment.    For the most part, she is unchanged.  She denies any abdominal pain, itching or skin rash, and she denies any fatigue.  She denies any increased abdominal girth.  She does have some mild lower extremity edema in her left foot only.  She has not had any gastrointestinal bleeding or any overt signs of hepatic encephalopathy.    She denies any fevers or chills, cough or shortness of breath.  She is fully vaccinated against COVID-19.  She denies any nausea or vomiting and has anywhere from 5 to 8 bowel movements a day, which has been a chronic problem for her.  She does use Imodium, which does work, although it can occasionally bind her up.  Her appetite has otherwise been good, and her weight has been stable.      Current Outpatient Medications   Medication     albuterol (PROAIR HFA/PROVENTIL HFA/VENTOLIN HFA) 108 (90 Base) MCG/ACT inhaler     Ascorbic Acid (VITAMIN C) 500 MG CHEW     budesonide-formoterol (SYMBICORT) 160-4.5 MCG/ACT Inhaler     Calcium Carb-Cholecalciferol (CALCIUM 500 + D3 PO)     fluticasone (FLOVENT HFA) 220 MCG/ACT Inhaler     ibuprofen (ADVIL/MOTRIN) 200 MG tablet     loperamide (IMODIUM A-D) 2 MG tablet     Multiple Vitamins-Minerals (MULTIVITAMIN ADULT PO)     No current facility-administered medications for this visit.     BP (!) 155/81 (BP Location: Right arm, Patient Position: Sitting, Cuff Size: Adult Regular)   Pulse 60   Temp 97.5  F (36.4  C) (Oral)   Resp 18   Ht 1.6 m (5' 2.99\")   Wt 72.3 kg (159 lb 6.4 oz)   SpO2 96%   BMI 28.24 kg/m  "     PHYSICAL EXAMINATION:    GENERAL:  She looks quite well.  HEENT:  No scleral icterus or temporal muscle wasting.  CHEST:  Clear.  ABDOMEN:  No increase in girth.  No masses or tenderness to palpation are present.  Her liver is 10 cm in span without left lobe enlargement.  No spleen tip is palpable.  EXTREMITIES:  No edema.  SKIN:  No stigmata of chronic liver disease.  NEUROLOGIC:  No asterixis.      Recent Results (from the past 168 hour(s))   Hepatic Panel [LAB20]    Collection Time: 11/18/21  6:27 AM   Result Value Ref Range    Bilirubin Total 1.0 0.2 - 1.3 mg/dL    Bilirubin Direct 0.3 (H) 0.0 - 0.2 mg/dL    Protein Total 6.6 (L) 6.8 - 8.8 g/dL    Albumin 3.6 3.4 - 5.0 g/dL    Alkaline Phosphatase 87 40 - 150 U/L    AST 42 0 - 45 U/L    ALT 62 (H) 0 - 50 U/L   Basic metabolic panel [LAB15]    Collection Time: 11/18/21  6:27 AM   Result Value Ref Range    Sodium 146 (H) 133 - 144 mmol/L    Potassium 4.4 3.4 - 5.3 mmol/L    Chloride 112 (H) 94 - 109 mmol/L    Carbon Dioxide (CO2) 27 20 - 32 mmol/L    Anion Gap 7 3 - 14 mmol/L    Urea Nitrogen 16 7 - 30 mg/dL    Creatinine 0.74 0.52 - 1.04 mg/dL    Calcium 8.8 8.5 - 10.1 mg/dL    Glucose 170 (H) 70 - 99 mg/dL    GFR Estimate 83 >60 mL/min/1.73m2   CBC with platelets [[id number]]    Collection Time: 11/18/21  6:27 AM   Result Value Ref Range    WBC Count 2.8 (L) 4.0 - 11.0 10e3/uL    RBC Count 4.64 3.80 - 5.20 10e6/uL    Hemoglobin 13.5 11.7 - 15.7 g/dL    Hematocrit 40.5 35.0 - 47.0 %    MCV 87 78 - 100 fL    MCH 29.1 26.5 - 33.0 pg    MCHC 33.3 31.5 - 36.5 g/dL    RDW 13.5 10.0 - 15.0 %    Platelet Count 86 (L) 150 - 450 10e3/uL   INR [[id number]]    Collection Time: 11/18/21  6:27 AM   Result Value Ref Range    INR 1.13 0.85 - 1.15      ULTRASOUND ABDOMEN COMPLETE 11/18/2021 6:57 AM     CLINICAL HISTORY: Cirrhosis of liver without ascites, unspecified hepatic cirrhosis type (H).      COMPARISONS: Ultrasound 5/20/2021     REFERRING PROVIDER: MASSIEL KRAFT  [place]     TECHNIQUE: Abdominal viscera evaluated with grayscale imaging. Splenic and main portal veins evaluated with color Doppler ultrasound.      Cine clips through the liver were saved in the patient's record.     FINDINGS:  Liver: 15 cm. Increased echotexture and echogenicity. No focal hepatic lesion.     Spleen: 5.4 x 17.6 x 17.7 cm.     Right kidney: 10.0 cm. Normal. No mass, stone, or hydronephrosis.     Left kidney: 11.4 cm. Normal. No mass, stone, or hydronephrosis.     Aorta:        Proximal: 2.3 cm.       Mid: 1.7 cm.       Distal: 2.2 cm.     Inferior vena cava: 1.5 cm.     Main portal vein diameter: 1.2 cm.     Ascites: None     Gallbladder: 3 mm gallbladder wall thickness. No pericholecystic fluid. Shadowing mobile stones. No sonographic Koch's sign elicited.     Common bile duct: 3.7 mm     Pancreas: Partially visualized. No abnormality demonstrated.     Splenic vein: antegrade  Main portal vein: antegrade                                                                      IMPRESSION:  1. Cirrhosis without focal liver lesion.       A. LI-RADS US Category: US-1 Negative: No US evidence of HCC       B. Recommend continued surveillance US.     2. Splenomegaly.     3. Cholelithiasis without evidence for cholecystitis.    IMPRESSION:  My impression is that Ms. Starks has cirrhosis, which is well compensated at this point in time.  Her ultrasound shows no evidence of HCC and all complications are well addressed.  The good news is that she got vaccinated since she was last seen.  I will not be making any change to her medical regimen.  I will see her back in the clinic in 6 months for repeat imaging and blood work.  She is up to date with regard to other vaccines as well, although she does decline a flu vaccine and she is up to date with regard to variceal screening.    Thank you very much for allowing me to participate in the care of this patient.  If you have any questions regarding my recommendations,  please do not hesitate to contact me.           Abbe Reynolds MD      Professor of Medicine  [employer]      Executive Medical Director, Solid Organ Transplant Program  [facility]

## 2021-11-18 NOTE — LETTER
11/18/2021     RE: Felisa Starks  [address]  [facility] [address]    Dear Colleague,    Thank you for referring your patient, Felisa Starks, to the [facility] [place]. Please see a copy of my visit note below.    HISTORY OF PRESENT ILLNESS:  I had the pleasure of seeing Felisa Starks for followup in the Liver Clinic at the [facility] on 11/18/2021.  Ms. Starks returns for followup of cirrhosis, likely caused by nonalcoholic fatty liver disease.  She also has a history of hepatitis C but is a sustained virologic responder to hepatitis C treatment.    For the most part, she is unchanged.  She denies any abdominal pain, itching or skin rash, and she denies any fatigue.  She denies any increased abdominal girth.  She does have some mild lower extremity edema in her left foot only.  She has not had any gastrointestinal bleeding or any overt signs of hepatic encephalopathy.    She denies any fevers or chills, cough or shortness of breath.  She is fully vaccinated against COVID-19.  She denies any nausea or vomiting and has anywhere from 5 to 8 bowel movements a day, which has been a chronic problem for her.  She does use Imodium, which does work, although it can occasionally bind her up.  Her appetite has otherwise been good, and her weight has been stable.      Current Outpatient Medications   Medication     albuterol (PROAIR HFA/PROVENTIL HFA/VENTOLIN HFA) 108 (90 Base) MCG/ACT inhaler     Ascorbic Acid (VITAMIN C) 500 MG CHEW     budesonide-formoterol (SYMBICORT) 160-4.5 MCG/ACT Inhaler     Calcium Carb-Cholecalciferol (CALCIUM 500 + D3 PO)     fluticasone (FLOVENT HFA) 220 MCG/ACT Inhaler     ibuprofen (ADVIL/MOTRIN) 200 MG tablet     loperamide (IMODIUM A-D) 2 MG tablet     Multiple Vitamins-Minerals (MULTIVITAMIN ADULT PO)     No current facility-administered medications for this visit.     BP (!) 155/81 (BP Location: Right arm, Patient Position: Sitting, Cuff  "Size: Adult Regular)   Pulse 60   Temp 97.5  F (36.4  C) (Oral)   Resp 18   Ht 1.6 m (5' 2.99\")   Wt 72.3 kg (159 lb 6.4 oz)   SpO2 96%   BMI 28.24 kg/m      PHYSICAL EXAMINATION:    GENERAL:  She looks quite well.  HEENT:  No scleral icterus or temporal muscle wasting.  CHEST:  Clear.  ABDOMEN:  No increase in girth.  No masses or tenderness to palpation are present.  Her liver is 10 cm in span without left lobe enlargement.  No spleen tip is palpable.  EXTREMITIES:  No edema.  SKIN:  No stigmata of chronic liver disease.  NEUROLOGIC:  No asterixis.      Recent Results (from the past 168 hour(s))   Hepatic Panel [LAB20]    Collection Time: 11/18/21  6:27 AM   Result Value Ref Range    Bilirubin Total 1.0 0.2 - 1.3 mg/dL    Bilirubin Direct 0.3 (H) 0.0 - 0.2 mg/dL    Protein Total 6.6 (L) 6.8 - 8.8 g/dL    Albumin 3.6 3.4 - 5.0 g/dL    Alkaline Phosphatase 87 40 - 150 U/L    AST 42 0 - 45 U/L    ALT 62 (H) 0 - 50 U/L   Basic metabolic panel [LAB15]    Collection Time: 11/18/21  6:27 AM   Result Value Ref Range    Sodium 146 (H) 133 - 144 mmol/L    Potassium 4.4 3.4 - 5.3 mmol/L    Chloride 112 (H) 94 - 109 mmol/L    Carbon Dioxide (CO2) 27 20 - 32 mmol/L    Anion Gap 7 3 - 14 mmol/L    Urea Nitrogen 16 7 - 30 mg/dL    Creatinine 0.74 0.52 - 1.04 mg/dL    Calcium 8.8 8.5 - 10.1 mg/dL    Glucose 170 (H) 70 - 99 mg/dL    GFR Estimate 83 >60 mL/min/1.73m2   CBC with platelets [[id number]]    Collection Time: 11/18/21  6:27 AM   Result Value Ref Range    WBC Count 2.8 (L) 4.0 - 11.0 10e3/uL    RBC Count 4.64 3.80 - 5.20 10e6/uL    Hemoglobin 13.5 11.7 - 15.7 g/dL    Hematocrit 40.5 35.0 - 47.0 %    MCV 87 78 - 100 fL    MCH 29.1 26.5 - 33.0 pg    MCHC 33.3 31.5 - 36.5 g/dL    RDW 13.5 10.0 - 15.0 %    Platelet Count 86 (L) 150 - 450 10e3/uL   INR [[id number]]    Collection Time: 11/18/21  6:27 AM   Result Value Ref Range    INR 1.13 0.85 - 1.15      ULTRASOUND ABDOMEN COMPLETE 11/18/2021 6:57 AM     CLINICAL HISTORY: " Cirrhosis of liver without ascites, unspecified hepatic cirrhosis type (H).      COMPARISONS: Ultrasound 5/20/2021     REFERRING PROVIDER: MASSIEL GONZALEZ     TECHNIQUE: Abdominal viscera evaluated with grayscale imaging. Splenic and main portal veins evaluated with color Doppler ultrasound.      Cine clips through the liver were saved in the patient's record.     FINDINGS:  Liver: 15 cm. Increased echotexture and echogenicity. No focal hepatic lesion.     Spleen: 5.4 x 17.6 x 17.7 cm.     Right kidney: 10.0 cm. Normal. No mass, stone, or hydronephrosis.     Left kidney: 11.4 cm. Normal. No mass, stone, or hydronephrosis.     Aorta:        Proximal: 2.3 cm.       Mid: 1.7 cm.       Distal: 2.2 cm.     Inferior vena cava: 1.5 cm.     Main portal vein diameter: 1.2 cm.     Ascites: None     Gallbladder: 3 mm gallbladder wall thickness. No pericholecystic fluid. Shadowing mobile stones. No sonographic Koch's sign elicited.     Common bile duct: 3.7 mm     Pancreas: Partially visualized. No abnormality demonstrated.     Splenic vein: antegrade  Main portal vein: antegrade                                                                      IMPRESSION:  1. Cirrhosis without focal liver lesion.       A. LI-RADS US Category: US-1 Negative: No US evidence of HCC       B. Recommend continued surveillance US.     2. Splenomegaly.     3. Cholelithiasis without evidence for cholecystitis.    IMPRESSION:  My impression is that Ms. Starks has cirrhosis, which is well compensated at this point in time.  Her ultrasound shows no evidence of HCC and all complications are well addressed.  The good news is that she got vaccinated since she was last seen.  I will not be making any change to her medical regimen.  I will see her back in the clinic in 6 months for repeat imaging and blood work.  She is up to date with regard to other vaccines as well, although she does decline a flu vaccine and she is up to date with regard to variceal  screening.    Thank you very much for allowing me to participate in the care of this patient.  If you have any questions regarding my recommendations, please do not hesitate to contact me.           Abbe Reynolds MD      Professor of Medicine  [employer]      Executive Medical Director, Solid Organ Transplant Program  [facility]

## 2022-01-02 ENCOUNTER — HEALTH MAINTENANCE LETTER (OUTPATIENT)
Age: 72
End: 2022-01-02

## 2022-04-24 ENCOUNTER — HEALTH MAINTENANCE LETTER (OUTPATIENT)
Age: 72
End: 2022-04-24

## 2022-05-24 ENCOUNTER — OFFICE VISIT (OUTPATIENT)
Dept: GASTROENTEROLOGY | Facility: CLINIC | Age: 72
End: 2022-05-24
Attending: INTERNAL MEDICINE
Payer: MEDICARE

## 2022-05-24 ENCOUNTER — ANCILLARY PROCEDURE (OUTPATIENT)
Dept: ULTRASOUND IMAGING | Facility: CLINIC | Age: 72
End: 2022-05-24
Attending: INTERNAL MEDICINE
Payer: MEDICARE

## 2022-05-24 ENCOUNTER — LAB (OUTPATIENT)
Dept: LAB | Facility: CLINIC | Age: 72
End: 2022-05-24
Payer: MEDICARE

## 2022-05-24 VITALS
WEIGHT: 164.9 LBS | OXYGEN SATURATION: 98 % | BODY MASS INDEX: 29.22 KG/M2 | SYSTOLIC BLOOD PRESSURE: 133 MMHG | TEMPERATURE: 97.5 F | DIASTOLIC BLOOD PRESSURE: 90 MMHG | HEART RATE: 62 BPM

## 2022-05-24 DIAGNOSIS — K74.60 CIRRHOSIS OF LIVER WITHOUT ASCITES, UNSPECIFIED HEPATIC CIRRHOSIS TYPE (H): ICD-10-CM

## 2022-05-24 DIAGNOSIS — Z23 NEED FOR COVID-19 VACCINE: ICD-10-CM

## 2022-05-24 DIAGNOSIS — K74.60 CIRRHOSIS OF LIVER WITHOUT ASCITES, UNSPECIFIED HEPATIC CIRRHOSIS TYPE (H): Primary | ICD-10-CM

## 2022-05-24 LAB
AFP SERPL-MCNC: 2.7 UG/L (ref 0–8)
ALBUMIN SERPL-MCNC: 3.5 G/DL (ref 3.4–5)
ALP SERPL-CCNC: 74 U/L (ref 40–150)
ALT SERPL W P-5'-P-CCNC: 47 U/L (ref 0–50)
ANION GAP SERPL CALCULATED.3IONS-SCNC: 5 MMOL/L (ref 3–14)
AST SERPL W P-5'-P-CCNC: 40 U/L (ref 0–45)
BILIRUB DIRECT SERPL-MCNC: 0.4 MG/DL (ref 0–0.2)
BILIRUB SERPL-MCNC: 1.1 MG/DL (ref 0.2–1.3)
BUN SERPL-MCNC: 17 MG/DL (ref 7–30)
CALCIUM SERPL-MCNC: 8.9 MG/DL (ref 8.5–10.1)
CHLORIDE BLD-SCNC: 108 MMOL/L (ref 94–109)
CO2 SERPL-SCNC: 31 MMOL/L (ref 20–32)
CREAT SERPL-MCNC: 0.69 MG/DL (ref 0.52–1.04)
ERYTHROCYTE [DISTWIDTH] IN BLOOD BY AUTOMATED COUNT: 13.4 % (ref 10–15)
GFR SERPL CREATININE-BSD FRML MDRD: >90 ML/MIN/1.73M2
GLUCOSE BLD-MCNC: 156 MG/DL (ref 70–99)
HCT VFR BLD AUTO: 37.8 % (ref 35–47)
HGB BLD-MCNC: 12.3 G/DL (ref 11.7–15.7)
INR PPP: 1.15 (ref 0.85–1.15)
MCH RBC QN AUTO: 29.1 PG (ref 26.5–33)
MCHC RBC AUTO-ENTMCNC: 32.5 G/DL (ref 31.5–36.5)
MCV RBC AUTO: 89 FL (ref 78–100)
PLATELET # BLD AUTO: 75 10E3/UL (ref 150–450)
POTASSIUM BLD-SCNC: 4.8 MMOL/L (ref 3.4–5.3)
PROT SERPL-MCNC: 6.1 G/DL (ref 6.8–8.8)
RBC # BLD AUTO: 4.23 10E6/UL (ref 3.8–5.2)
SODIUM SERPL-SCNC: 144 MMOL/L (ref 133–144)
WBC # BLD AUTO: 2.5 10E3/UL (ref 4–11)

## 2022-05-24 PROCEDURE — 82105 ALPHA-FETOPROTEIN SERUM: CPT | Performed by: INTERNAL MEDICINE

## 2022-05-24 PROCEDURE — 0064A HC ADMIN COVID VAC MODERNA, 0.25ML BOOSTER: CPT | Performed by: INTERNAL MEDICINE

## 2022-05-24 PROCEDURE — 250N000011 HC RX IP 250 OP 636: Performed by: INTERNAL MEDICINE

## 2022-05-24 PROCEDURE — 80053 COMPREHEN METABOLIC PANEL: CPT | Performed by: PATHOLOGY

## 2022-05-24 PROCEDURE — G0463 HOSPITAL OUTPT CLINIC VISIT: HCPCS | Mod: 25

## 2022-05-24 PROCEDURE — 85610 PROTHROMBIN TIME: CPT | Performed by: PATHOLOGY

## 2022-05-24 PROCEDURE — 36415 COLL VENOUS BLD VENIPUNCTURE: CPT | Performed by: PATHOLOGY

## 2022-05-24 PROCEDURE — 99214 OFFICE O/P EST MOD 30 MIN: CPT | Performed by: INTERNAL MEDICINE

## 2022-05-24 PROCEDURE — 91306 HC RX IP 250 OP 636: CPT | Performed by: INTERNAL MEDICINE

## 2022-05-24 PROCEDURE — 85027 COMPLETE CBC AUTOMATED: CPT | Performed by: PATHOLOGY

## 2022-05-24 PROCEDURE — 82248 BILIRUBIN DIRECT: CPT | Performed by: PATHOLOGY

## 2022-05-24 PROCEDURE — 76700 US EXAM ABDOM COMPLETE: CPT | Performed by: RADIOLOGY

## 2022-05-24 RX ORDER — FLUTICASONE PROPIONATE 50 MCG
SPRAY, SUSPENSION (ML) NASAL
COMMUNITY
Start: 2022-05-03

## 2022-05-24 RX ADMIN — CX-024414 0.25 ML: 0.2 INJECTION, SUSPENSION INTRAMUSCULAR at 08:46

## 2022-05-24 ASSESSMENT — PAIN SCALES - GENERAL: PAINLEVEL: NO PAIN (0)

## 2022-05-24 NOTE — LETTER
5/24/2022         RE: Felisa Starks  [address]  [facility] [address]        Dear Colleague,    Thank you for referring your patient, Felisa Starks, to the [facility] [place]. Please see a copy of my visit note below.    HISTORY OF PRESENT ILLNESS:  I had the pleasure of seeing Felisa Starks for followup in the Liver Clinic at the [facility] on 05/24/2022.  Ms. Starks returns for followup of cirrhosis caused by hepatitis C and probably a component of nonalcoholic fatty liver disease.  She is a sustained virologic responder to hepatitis C treatment.    She is doing well at this visit.  She denies any abdominal pain, itching, skin rash or fatigue.  She continues to work several jobs.  She denies any increased abdominal girth.  She does have some lower extremity edema.  She has no gastrointestinal bleeding or any overt signs of hepatic encephalopathy.  She has not been hospitalized since she was last seen.    She denies any fevers or chills.  She has a cough and occasional shortness of breath related to some COPD.  She denies any nausea or vomiting and is prone to loose stools.  She does take Imodium, which does work.  Her appetite has been good.  Unfortunately, she has not lost any weight.    There otherwise have been no other new events since she was last seen.  She has received only one dose of the J&J vaccine.    Current Outpatient Medications   Medication     albuterol (PROAIR HFA/PROVENTIL HFA/VENTOLIN HFA) 108 (90 Base) MCG/ACT inhaler     Ascorbic Acid (VITAMIN C) 500 MG CHEW     budesonide-formoterol (SYMBICORT) 160-4.5 MCG/ACT Inhaler     Calcium Carb-Cholecalciferol (CALCIUM 500 + D3 PO)     fluticasone (FLONASE) 50 MCG/ACT nasal spray     fluticasone (FLOVENT HFA) 220 MCG/ACT inhaler     ibuprofen (ADVIL/MOTRIN) 200 MG tablet     loperamide (IMODIUM A-D) 2 MG tablet     Multiple Vitamins-Minerals (MULTIVITAMIN ADULT PO)     Current  Facility-Administered Medications   Medication     COVID-19 mRNA vacc (MODERNA BOOSTER) injection 0.25 mL     BP (!) 133/90   Pulse 62   Temp 97.5  F (36.4  C) (Oral)   Wt 74.8 kg (164 lb 14.4 oz)   SpO2 98%   BMI 29.22 kg/m      PHYSICAL EXAMINATION:    GENERAL:  She looks quite well.  HEENT:  Shows no scleral icterus or temporal muscle wasting.  CHEST:  Clear.  ABDOMEN:  No increase in girth.  No masses or tenderness to palpation are present.  Her liver is 10 cm in span without left lobe enlargement.  No spleen tip is palpable.    EXTREMITIES:  No edema.  SKIN:  No stigmata of chronic liver disease.  NEUROLOGIC:  Shows no asterixis.    Recent Results (from the past 168 hour(s))   Hepatic Panel [LAB20]    Collection Time: 05/24/22  6:53 AM   Result Value Ref Range    Bilirubin Total 1.1 0.2 - 1.3 mg/dL    Bilirubin Direct 0.4 (H) 0.0 - 0.2 mg/dL    Protein Total 6.1 (L) 6.8 - 8.8 g/dL    Albumin 3.5 3.4 - 5.0 g/dL    Alkaline Phosphatase 74 40 - 150 U/L    AST 40 0 - 45 U/L    ALT 47 0 - 50 U/L   Basic metabolic panel [LAB15]    Collection Time: 05/24/22  6:53 AM   Result Value Ref Range    Sodium 144 133 - 144 mmol/L    Potassium 4.8 3.4 - 5.3 mmol/L    Chloride 108 94 - 109 mmol/L    Carbon Dioxide (CO2) 31 20 - 32 mmol/L    Anion Gap 5 3 - 14 mmol/L    Urea Nitrogen 17 7 - 30 mg/dL    Creatinine 0.69 0.52 - 1.04 mg/dL    Calcium 8.9 8.5 - 10.1 mg/dL    Glucose 156 (H) 70 - 99 mg/dL    GFR Estimate >90 >60 mL/min/1.73m2   CBC with platelets [[id number]]    Collection Time: 05/24/22  6:53 AM   Result Value Ref Range    WBC Count 2.5 (L) 4.0 - 11.0 10e3/uL    RBC Count 4.23 3.80 - 5.20 10e6/uL    Hemoglobin 12.3 11.7 - 15.7 g/dL    Hematocrit 37.8 35.0 - 47.0 %    MCV 89 78 - 100 fL    MCH 29.1 26.5 - 33.0 pg    MCHC 32.5 31.5 - 36.5 g/dL    RDW 13.4 10.0 - 15.0 %    Platelet Count 75 (L) 150 - 450 10e3/uL   INR [[id number]]    Collection Time: 05/24/22  6:53 AM   Result Value Ref Range    INR 1.15 0.85 - 1.15       Exam: US ABDOMEN COMPLETE 5/24/2022 7:35 AM     Indication: Patient at high risk for HCC. Cirrhosis of liver without  ascites.     Comparison: 11/18/2021.     Technique: The abdomen was scanned in the standard fashion with  specialized ultrasound transducer(s) using both gray scale and limited  color/spectral Doppler techniques.     Findings:     Liver:  The liver demonstrates a coarsened echotexture. No mass or  intrahepatic biliary ductal dilatation. The main portal vein is patent  with antegrade flow. US visualization score: A - No or minimal  limitations.     Gallbladder: The gallbladder is well distended and of normal  morphology. No wall thickening, pericholecystic fluid, or sonographic  Koch's sign. Mobile cholelithiasis.     Bile Ducts: Both the intra- and extrahepatic biliary system are of  normal caliber. The common bile duct measures 4.5 mm in diameter.     Pancreas: Visualized portions of the head and body of the pancreas are  unremarkable.     Kidneys: Both kidneys are of normal echotexture, without mass nor  hydronephrosis. The craniocaudal dimensions are: right- 9.4 cm, left-  10.7 cm.     Spleen: The spleen is normal in size, measuring 18.0 cm in sagittal  dimension.     Aorta and IVC: The visualized portions of the aorta and IVC are  unremarkable. The aorta measures 2.2 cm in diameter and the IVC  measures 2.0 cm in diameter.     Fluid: No evidence of ascites or pleural effusions.                                                                  Impression:  1. Cirrhosis without focal liver lesion.  a. LI-RADS US Category: US-1 Negative: No US evidence of HCC.  b. Recommend continued surveillance US.  2. Splenomegaly.  3. Cholelithiasis.    IMPRESSION:  Ms. Starks has cirrhosis caused by a combination of nonalcoholic fatty liver disease and hepatitis C.  As mentioned, she is a sustained virologic responder to hepatitis C treatment.      She will get an additional COVID-19 vaccine today, which  will be an mRNA vaccine.  She is otherwise up to date with regard to other vaccines and cancer screening.  It has been 5 years since she had her last endoscopy that showed small varices and I would recommend she get that done.  She could have that done in [place] there.      Otherwise, my plan will be to see her back in the clinic in 6 months for repeat imaging and blood work.      Her liver function is excellent, as is her kidney function and her ultrasound shows no mass lesions.    Thank you very much for allowing me to participate in the care of this patient.  If you have any questions regarding my recommendations, please do not hesitate to contact me.         Abbe Reynolds MD      Professor of Medicine  [employer]      Executive Medical Director, Solid Organ Transplant Program  [facility]

## 2022-05-24 NOTE — NURSING NOTE
Chief Complaint   Patient presents with     RECHECK     6 mo f/u       BP (!) 133/90   Pulse 62   Temp 97.5  F (36.4  C) (Oral)   Wt 74.8 kg (164 lb 14.4 oz)   SpO2 98%   BMI 29.22 kg/m      Alfredo Rivera MA on 5/24/2022 at 8:07 AM

## 2022-05-24 NOTE — PROGRESS NOTES
HISTORY OF PRESENT ILLNESS:  I had the pleasure of seeing Felisa Starks for followup in the Liver Clinic at the [facility] on 05/24/2022.  Ms. Starks returns for followup of cirrhosis caused by hepatitis C and probably a component of nonalcoholic fatty liver disease.  She is a sustained virologic responder to hepatitis C treatment.    She is doing well at this visit.  She denies any abdominal pain, itching, skin rash or fatigue.  She continues to work several jobs.  She denies any increased abdominal girth.  She does have some lower extremity edema.  She has no gastrointestinal bleeding or any overt signs of hepatic encephalopathy.  She has not been hospitalized since she was last seen.    She denies any fevers or chills.  She has a cough and occasional shortness of breath related to some COPD.  She denies any nausea or vomiting and is prone to loose stools.  She does take Imodium, which does work.  Her appetite has been good.  Unfortunately, she has not lost any weight.    There otherwise have been no other new events since she was last seen.  She has received only one dose of the J&J vaccine.    Current Outpatient Medications   Medication     albuterol (PROAIR HFA/PROVENTIL HFA/VENTOLIN HFA) 108 (90 Base) MCG/ACT inhaler     Ascorbic Acid (VITAMIN C) 500 MG CHEW     budesonide-formoterol (SYMBICORT) 160-4.5 MCG/ACT Inhaler     Calcium Carb-Cholecalciferol (CALCIUM 500 + D3 PO)     fluticasone (FLONASE) 50 MCG/ACT nasal spray     fluticasone (FLOVENT HFA) 220 MCG/ACT inhaler     ibuprofen (ADVIL/MOTRIN) 200 MG tablet     loperamide (IMODIUM A-D) 2 MG tablet     Multiple Vitamins-Minerals (MULTIVITAMIN ADULT PO)     Current Facility-Administered Medications   Medication     COVID-19 mRNA vacc (MODERNA BOOSTER) injection 0.25 mL     BP (!) 133/90   Pulse 62   Temp 97.5  F (36.4  C) (Oral)   Wt 74.8 kg (164 lb 14.4 oz)   SpO2 98%   BMI 29.22 kg/m      PHYSICAL EXAMINATION:    GENERAL:   She looks quite well.  HEENT:  Shows no scleral icterus or temporal muscle wasting.  CHEST:  Clear.  ABDOMEN:  No increase in girth.  No masses or tenderness to palpation are present.  Her liver is 10 cm in span without left lobe enlargement.  No spleen tip is palpable.    EXTREMITIES:  No edema.  SKIN:  No stigmata of chronic liver disease.  NEUROLOGIC:  Shows no asterixis.    Recent Results (from the past 168 hour(s))   Hepatic Panel [LAB20]    Collection Time: 05/24/22  6:53 AM   Result Value Ref Range    Bilirubin Total 1.1 0.2 - 1.3 mg/dL    Bilirubin Direct 0.4 (H) 0.0 - 0.2 mg/dL    Protein Total 6.1 (L) 6.8 - 8.8 g/dL    Albumin 3.5 3.4 - 5.0 g/dL    Alkaline Phosphatase 74 40 - 150 U/L    AST 40 0 - 45 U/L    ALT 47 0 - 50 U/L   Basic metabolic panel [LAB15]    Collection Time: 05/24/22  6:53 AM   Result Value Ref Range    Sodium 144 133 - 144 mmol/L    Potassium 4.8 3.4 - 5.3 mmol/L    Chloride 108 94 - 109 mmol/L    Carbon Dioxide (CO2) 31 20 - 32 mmol/L    Anion Gap 5 3 - 14 mmol/L    Urea Nitrogen 17 7 - 30 mg/dL    Creatinine 0.69 0.52 - 1.04 mg/dL    Calcium 8.9 8.5 - 10.1 mg/dL    Glucose 156 (H) 70 - 99 mg/dL    GFR Estimate >90 >60 mL/min/1.73m2   CBC with platelets [[id number]]    Collection Time: 05/24/22  6:53 AM   Result Value Ref Range    WBC Count 2.5 (L) 4.0 - 11.0 10e3/uL    RBC Count 4.23 3.80 - 5.20 10e6/uL    Hemoglobin 12.3 11.7 - 15.7 g/dL    Hematocrit 37.8 35.0 - 47.0 %    MCV 89 78 - 100 fL    MCH 29.1 26.5 - 33.0 pg    MCHC 32.5 31.5 - 36.5 g/dL    RDW 13.4 10.0 - 15.0 %    Platelet Count 75 (L) 150 - 450 10e3/uL   INR [[id number]]    Collection Time: 05/24/22  6:53 AM   Result Value Ref Range    INR 1.15 0.85 - 1.15      Exam: US ABDOMEN COMPLETE 5/24/2022 7:35 AM     Indication: Patient at high risk for HCC. Cirrhosis of liver without  ascites.     Comparison: 11/18/2021.     Technique: The abdomen was scanned in the standard fashion with  specialized ultrasound transducer(s) using both  gray scale and limited  color/spectral Doppler techniques.     Findings:     Liver:  The liver demonstrates a coarsened echotexture. No mass or  intrahepatic biliary ductal dilatation. The main portal vein is patent  with antegrade flow. US visualization score: A - No or minimal  limitations.     Gallbladder: The gallbladder is well distended and of normal  morphology. No wall thickening, pericholecystic fluid, or sonographic  Koch's sign. Mobile cholelithiasis.     Bile Ducts: Both the intra- and extrahepatic biliary system are of  normal caliber. The common bile duct measures 4.5 mm in diameter.     Pancreas: Visualized portions of the head and body of the pancreas are  unremarkable.     Kidneys: Both kidneys are of normal echotexture, without mass nor  hydronephrosis. The craniocaudal dimensions are: right- 9.4 cm, left-  10.7 cm.     Spleen: The spleen is normal in size, measuring 18.0 cm in sagittal  dimension.     Aorta and IVC: The visualized portions of the aorta and IVC are  unremarkable. The aorta measures 2.2 cm in diameter and the IVC  measures 2.0 cm in diameter.     Fluid: No evidence of ascites or pleural effusions.                                                                  Impression:  1. Cirrhosis without focal liver lesion.  a. LI-RADS US Category: US-1 Negative: No US evidence of HCC.  b. Recommend continued surveillance US.  2. Splenomegaly.  3. Cholelithiasis.    IMPRESSION:  Ms. Starks has cirrhosis caused by a combination of nonalcoholic fatty liver disease and hepatitis C.  As mentioned, she is a sustained virologic responder to hepatitis C treatment.      She will get an additional COVID-19 vaccine today, which will be an mRNA vaccine.  She is otherwise up to date with regard to other vaccines and cancer screening.  It has been 5 years since she had her last endoscopy that showed small varices and I would recommend she get that done.  She could have that done in [place] there.       Otherwise, my plan will be to see her back in the clinic in 6 months for repeat imaging and blood work.      Her liver function is excellent, as is her kidney function and her ultrasound shows no mass lesions.    Thank you very much for allowing me to participate in the care of this patient.  If you have any questions regarding my recommendations, please do not hesitate to contact me.         Abbe Reynolds MD      Professor of Medicine  [employer]      Executive Medical Director, Solid Organ Transplant Program  [facility]

## 2022-08-11 ENCOUNTER — TELEPHONE (OUTPATIENT)
Dept: GASTROENTEROLOGY | Facility: CLINIC | Age: 72
End: 2022-08-11

## 2022-08-14 ENCOUNTER — HEALTH MAINTENANCE LETTER (OUTPATIENT)
Age: 72
End: 2022-08-14

## 2022-11-19 ENCOUNTER — HEALTH MAINTENANCE LETTER (OUTPATIENT)
Age: 72
End: 2022-11-19

## 2022-11-23 ENCOUNTER — TRANSFERRED RECORDS (OUTPATIENT)
Dept: HEALTH INFORMATION MANAGEMENT | Facility: CLINIC | Age: 72
End: 2022-11-23

## 2023-01-05 ENCOUNTER — ANCILLARY PROCEDURE (OUTPATIENT)
Dept: ULTRASOUND IMAGING | Facility: CLINIC | Age: 73
End: 2023-01-05
Attending: INTERNAL MEDICINE
Payer: MEDICARE

## 2023-01-05 ENCOUNTER — OFFICE VISIT (OUTPATIENT)
Dept: GASTROENTEROLOGY | Facility: CLINIC | Age: 73
End: 2023-01-05
Attending: INTERNAL MEDICINE
Payer: MEDICARE

## 2023-01-05 ENCOUNTER — LAB (OUTPATIENT)
Dept: LAB | Facility: CLINIC | Age: 73
End: 2023-01-05
Attending: INTERNAL MEDICINE
Payer: MEDICARE

## 2023-01-05 ENCOUNTER — TRANSFERRED RECORDS (OUTPATIENT)
Dept: HEALTH INFORMATION MANAGEMENT | Facility: CLINIC | Age: 73
End: 2023-01-05

## 2023-01-05 VITALS
DIASTOLIC BLOOD PRESSURE: 93 MMHG | HEART RATE: 81 BPM | BODY MASS INDEX: 28.39 KG/M2 | SYSTOLIC BLOOD PRESSURE: 145 MMHG | OXYGEN SATURATION: 95 % | WEIGHT: 160.2 LBS | TEMPERATURE: 97.7 F

## 2023-01-05 DIAGNOSIS — K74.60 CIRRHOSIS OF LIVER WITHOUT ASCITES, UNSPECIFIED HEPATIC CIRRHOSIS TYPE (H): ICD-10-CM

## 2023-01-05 DIAGNOSIS — K74.60 CIRRHOSIS OF LIVER WITHOUT ASCITES, UNSPECIFIED HEPATIC CIRRHOSIS TYPE (H): Primary | ICD-10-CM

## 2023-01-05 LAB
AFP SERPL-MCNC: 2.5 NG/ML
ALBUMIN SERPL BCG-MCNC: 3.8 G/DL (ref 3.5–5.2)
ALP SERPL-CCNC: 83 U/L (ref 35–104)
ALT SERPL W P-5'-P-CCNC: 37 U/L (ref 10–35)
ANION GAP SERPL CALCULATED.3IONS-SCNC: 7 MMOL/L (ref 7–15)
AST SERPL W P-5'-P-CCNC: 38 U/L (ref 10–35)
BILIRUB DIRECT SERPL-MCNC: 0.38 MG/DL (ref 0–0.3)
BILIRUB SERPL-MCNC: 1.3 MG/DL
BUN SERPL-MCNC: 11.8 MG/DL (ref 8–23)
CALCIUM SERPL-MCNC: 9 MG/DL (ref 8.8–10.2)
CHLORIDE SERPL-SCNC: 106 MMOL/L (ref 98–107)
CREAT SERPL-MCNC: 0.62 MG/DL (ref 0.51–0.95)
DEPRECATED HCO3 PLAS-SCNC: 27 MMOL/L (ref 22–29)
ERYTHROCYTE [DISTWIDTH] IN BLOOD BY AUTOMATED COUNT: 13.2 % (ref 10–15)
GFR SERPL CREATININE-BSD FRML MDRD: >90 ML/MIN/1.73M2
GLUCOSE SERPL-MCNC: 168 MG/DL (ref 70–99)
HCT VFR BLD AUTO: 37 % (ref 35–47)
HGB BLD-MCNC: 12.7 G/DL (ref 11.7–15.7)
INR PPP: 1.15 (ref 0.85–1.15)
MCH RBC QN AUTO: 29.1 PG (ref 26.5–33)
MCHC RBC AUTO-ENTMCNC: 34.3 G/DL (ref 31.5–36.5)
MCV RBC AUTO: 85 FL (ref 78–100)
PLATELET # BLD AUTO: 73 10E3/UL (ref 150–450)
POTASSIUM SERPL-SCNC: 3.8 MMOL/L (ref 3.4–5.3)
PROT SERPL-MCNC: 6.1 G/DL (ref 6.4–8.3)
RBC # BLD AUTO: 4.37 10E6/UL (ref 3.8–5.2)
SODIUM SERPL-SCNC: 140 MMOL/L (ref 136–145)
WBC # BLD AUTO: 2.6 10E3/UL (ref 4–11)

## 2023-01-05 PROCEDURE — 82105 ALPHA-FETOPROTEIN SERUM: CPT | Performed by: INTERNAL MEDICINE

## 2023-01-05 PROCEDURE — 36415 COLL VENOUS BLD VENIPUNCTURE: CPT | Performed by: PATHOLOGY

## 2023-01-05 PROCEDURE — 76705 ECHO EXAM OF ABDOMEN: CPT | Mod: GC | Performed by: STUDENT IN AN ORGANIZED HEALTH CARE EDUCATION/TRAINING PROGRAM

## 2023-01-05 PROCEDURE — 82248 BILIRUBIN DIRECT: CPT | Performed by: PATHOLOGY

## 2023-01-05 PROCEDURE — 85610 PROTHROMBIN TIME: CPT | Performed by: PATHOLOGY

## 2023-01-05 PROCEDURE — 80053 COMPREHEN METABOLIC PANEL: CPT | Performed by: PATHOLOGY

## 2023-01-05 PROCEDURE — 99214 OFFICE O/P EST MOD 30 MIN: CPT | Performed by: INTERNAL MEDICINE

## 2023-01-05 PROCEDURE — 85027 COMPLETE CBC AUTOMATED: CPT | Performed by: PATHOLOGY

## 2023-01-05 PROCEDURE — G0463 HOSPITAL OUTPT CLINIC VISIT: HCPCS | Performed by: INTERNAL MEDICINE

## 2023-01-05 ASSESSMENT — PAIN SCALES - GENERAL: PAINLEVEL: NO PAIN (0)

## 2023-01-05 NOTE — NURSING NOTE
Chief Complaint   Patient presents with     RECHECK     Return visit.     Blood pressure (!) 145/93, pulse 81, temperature 97.7  F (36.5  C), weight 72.7 kg (160 lb 3.2 oz), SpO2 95 %, not currently breastfeeding.    MARCELA GONZALEZ

## 2023-01-05 NOTE — LETTER
1/5/2023         RE: Felisa Starks  [address]  [facility] [address]        Dear Colleague,    Thank you for referring your patient, Felisa Starks, to the [facility] [place]. Please see a copy of my visit note below.    HISTORY OF PRESENT ILLNESS:  I had the pleasure of seeing Felisa Starks for followup in the Liver Clinic at the [facility] on 01/05/2023.  Ms. Starks returns for followup of cirrhosis caused by chronic hepatitis C.  She is a sustained virologic responder to hepatitis C treatment.    For the most part, she is doing fairly well.  She denies any abdominal pain.  She has some very mild itching and only intermittent fatigue.  She denies any increased abdominal girth or lower extremity edema.  She has not had any gastrointestinal bleeding or any overt signs of hepatic encephalopathy.  She does report that she had a recent endoscopy by Dr. Laech, which I do not have the results of, that reportedly did show an ulcer.  It is not clear whether it showed any varices or not.    She denies any fevers or chills, cough or shortness of breath.  She denies any nausea or vomiting, diarrhea or constipation.  Her appetite has been good, and her weight for the most part has been stable.    Otherwise, there have been no other new events since she was last seen.    Current Outpatient Medications   Medication     albuterol (PROAIR HFA/PROVENTIL HFA/VENTOLIN HFA) 108 (90 Base) MCG/ACT inhaler     Ascorbic Acid (VITAMIN C) 500 MG CHEW     budesonide-formoterol (SYMBICORT) 160-4.5 MCG/ACT Inhaler     Calcium Carb-Cholecalciferol (CALCIUM 500 + D3 PO)     fluticasone (FLONASE) 50 MCG/ACT nasal spray     fluticasone (FLOVENT HFA) 220 MCG/ACT inhaler     ibuprofen (ADVIL/MOTRIN) 200 MG tablet     loperamide (IMODIUM A-D) 2 MG tablet     Multiple Vitamins-Minerals (MULTIVITAMIN ADULT PO)     omeprazole (PRILOSEC) 20 MG DR capsule     No current facility-administered  medications for this visit.     BP (!) 145/93   Pulse 81   Temp 97.7  F (36.5  C)   Wt 72.7 kg (160 lb 3.2 oz)   SpO2 95%   BMI 28.39 kg/m      PHYSICAL EXAMINATION:    GENERAL:  She looks quite well.  HEENT:  Shows no scleral icterus or temporal muscle wasting.  CHEST:  Clear.  ABDOMEN:  No increase in girth.  No masses or tenderness to palpation are present.  Her liver is 10 cm in span without left lobe enlargement.  No spleen tip is palpable.  EXTREMITIES:  No edema.  SKIN:  No stigmata of chronic liver disease.  NEUROLOGIC:  Shows no asterixis.    Recent Results (from the past 168 hour(s))   Hepatic Panel [LAB20]    Collection Time: 01/05/23  8:14 AM   Result Value Ref Range    Protein Total 6.1 (L) 6.4 - 8.3 g/dL    Albumin 3.8 3.5 - 5.2 g/dL    Bilirubin Total 1.3 (H) <=1.2 mg/dL    Alkaline Phosphatase 83 35 - 104 U/L    AST 38 (H) 10 - 35 U/L    ALT 37 (H) 10 - 35 U/L    Bilirubin Direct 0.38 (H) 0.00 - 0.30 mg/dL   Basic metabolic panel [LAB15]    Collection Time: 01/05/23  8:14 AM   Result Value Ref Range    Sodium 140 136 - 145 mmol/L    Potassium 3.8 3.4 - 5.3 mmol/L    Chloride 106 98 - 107 mmol/L    Carbon Dioxide (CO2) 27 22 - 29 mmol/L    Anion Gap 7 7 - 15 mmol/L    Urea Nitrogen 11.8 8.0 - 23.0 mg/dL    Creatinine 0.62 0.51 - 0.95 mg/dL    Calcium 9.0 8.8 - 10.2 mg/dL    Glucose 168 (H) 70 - 99 mg/dL    GFR Estimate >90 >60 mL/min/1.73m2   CBC with platelets [[id number]]    Collection Time: 01/05/23  8:14 AM   Result Value Ref Range    WBC Count 2.6 (L) 4.0 - 11.0 10e3/uL    RBC Count 4.37 3.80 - 5.20 10e6/uL    Hemoglobin 12.7 11.7 - 15.7 g/dL    Hematocrit 37.0 35.0 - 47.0 %    MCV 85 78 - 100 fL    MCH 29.1 26.5 - 33.0 pg    MCHC 34.3 31.5 - 36.5 g/dL    RDW 13.2 10.0 - 15.0 %    Platelet Count 73 (L) 150 - 450 10e3/uL   INR [[id number]]    Collection Time: 01/05/23  8:14 AM   Result Value Ref Range    INR 1.15 0.85 - 1.15      EXAMINATION: Limited Abdominal Ultrasound, 1/5/2023 7:59 AM       COMPARISON: Abdominal ultrasound 5/24/2022     History: HCC screen; Cirrhosis of liver without ascites, unspecified hepatic cirrhosis type (H).      FINDINGS:  Pancreas: Visualized portions of the head and body of the pancreas are unremarkable. Pancreas is partially obscured.     Liver: The liver measures 14.6 cm and demonstrates coarsened and nodular parenchyma. No evidence of a focal hepatic mass. The main portal vein is patent with antegrade flow.    US visualization score: A - No or minimal limitations     Gallbladder: Cholelithiasis without wall thickening, pericholecystic fluid, or positive sonographic Koch's sign.     Bile Ducts: Both the intra- and extrahepatic biliary system are of normal caliber.  The common bile duct measures 6 mm in diameter.     Right Kidney: Measures 10.9 cm in length with normal parenchymal echogenicity and cortical thickness. No hydronephrosis.     Fluid: None in the visualized abdomen.                                                                IMPRESSION:  1. Cirrhosis without focal lesion demonstrated.    a. LI-RADS US Category: US-1 Negative: No US evidence of HCC    b. Recommend continued surveillance US.  2. Cholelithiasis without sonographic evidence for acute cholecystitis.    IMPRESSION:  Ms. Starks has well-compensated cirrhosis caused by chronic hepatitis C for which she is a sustained virologic responder to hepatitis C treatment.  She did get a flu shot.  She is declining again the COVID-19 vaccines.  She is up to date with regard to variceal screening and other cancer screening as well.  I will not be making any change to her medical regimen.  I will see her back in the clinic in 6 months for repeat imaging and blood work.    I did spend total of 30 minutes (on the date of the encounter), including 20 minutes of face-to-face clinic time including counseling.  The rest of the time was spent in documentation and review of records.     Thank you very much for  allowing me to participate in the care of this patient.  If you have any questions regarding recommendations, please do not hesitate to contact me.         Abbe Reynolds MD      Professor of Medicine  [employer]      Executive Medical Director, Solid Organ Transplant Program  [facility]

## 2023-01-05 NOTE — PROGRESS NOTES
HISTORY OF PRESENT ILLNESS:  I had the pleasure of seeing Felisa Starks for followup in the Liver Clinic at the [facility] on 01/05/2023.  Ms. Starks returns for followup of cirrhosis caused by chronic hepatitis C.  She is a sustained virologic responder to hepatitis C treatment.    For the most part, she is doing fairly well.  She denies any abdominal pain.  She has some very mild itching and only intermittent fatigue.  She denies any increased abdominal girth or lower extremity edema.  She has not had any gastrointestinal bleeding or any overt signs of hepatic encephalopathy.  She does report that she had a recent endoscopy by Dr. Leach, which I do not have the results of, that reportedly did show an ulcer.  It is not clear whether it showed any varices or not.    She denies any fevers or chills, cough or shortness of breath.  She denies any nausea or vomiting, diarrhea or constipation.  Her appetite has been good, and her weight for the most part has been stable.    Otherwise, there have been no other new events since she was last seen.    Current Outpatient Medications   Medication     albuterol (PROAIR HFA/PROVENTIL HFA/VENTOLIN HFA) 108 (90 Base) MCG/ACT inhaler     Ascorbic Acid (VITAMIN C) 500 MG CHEW     budesonide-formoterol (SYMBICORT) 160-4.5 MCG/ACT Inhaler     Calcium Carb-Cholecalciferol (CALCIUM 500 + D3 PO)     fluticasone (FLONASE) 50 MCG/ACT nasal spray     fluticasone (FLOVENT HFA) 220 MCG/ACT inhaler     ibuprofen (ADVIL/MOTRIN) 200 MG tablet     loperamide (IMODIUM A-D) 2 MG tablet     Multiple Vitamins-Minerals (MULTIVITAMIN ADULT PO)     omeprazole (PRILOSEC) 20 MG DR capsule     No current facility-administered medications for this visit.     BP (!) 145/93   Pulse 81   Temp 97.7  F (36.5  C)   Wt 72.7 kg (160 lb 3.2 oz)   SpO2 95%   BMI 28.39 kg/m      PHYSICAL EXAMINATION:    GENERAL:  She looks quite well.  HEENT:  Shows no scleral icterus or temporal  muscle wasting.  CHEST:  Clear.  ABDOMEN:  No increase in girth.  No masses or tenderness to palpation are present.  Her liver is 10 cm in span without left lobe enlargement.  No spleen tip is palpable.  EXTREMITIES:  No edema.  SKIN:  No stigmata of chronic liver disease.  NEUROLOGIC:  Shows no asterixis.    Recent Results (from the past 168 hour(s))   Hepatic Panel [LAB20]    Collection Time: 01/05/23  8:14 AM   Result Value Ref Range    Protein Total 6.1 (L) 6.4 - 8.3 g/dL    Albumin 3.8 3.5 - 5.2 g/dL    Bilirubin Total 1.3 (H) <=1.2 mg/dL    Alkaline Phosphatase 83 35 - 104 U/L    AST 38 (H) 10 - 35 U/L    ALT 37 (H) 10 - 35 U/L    Bilirubin Direct 0.38 (H) 0.00 - 0.30 mg/dL   Basic metabolic panel [LAB15]    Collection Time: 01/05/23  8:14 AM   Result Value Ref Range    Sodium 140 136 - 145 mmol/L    Potassium 3.8 3.4 - 5.3 mmol/L    Chloride 106 98 - 107 mmol/L    Carbon Dioxide (CO2) 27 22 - 29 mmol/L    Anion Gap 7 7 - 15 mmol/L    Urea Nitrogen 11.8 8.0 - 23.0 mg/dL    Creatinine 0.62 0.51 - 0.95 mg/dL    Calcium 9.0 8.8 - 10.2 mg/dL    Glucose 168 (H) 70 - 99 mg/dL    GFR Estimate >90 >60 mL/min/1.73m2   CBC with platelets [[id number]]    Collection Time: 01/05/23  8:14 AM   Result Value Ref Range    WBC Count 2.6 (L) 4.0 - 11.0 10e3/uL    RBC Count 4.37 3.80 - 5.20 10e6/uL    Hemoglobin 12.7 11.7 - 15.7 g/dL    Hematocrit 37.0 35.0 - 47.0 %    MCV 85 78 - 100 fL    MCH 29.1 26.5 - 33.0 pg    MCHC 34.3 31.5 - 36.5 g/dL    RDW 13.2 10.0 - 15.0 %    Platelet Count 73 (L) 150 - 450 10e3/uL   INR [[id number]]    Collection Time: 01/05/23  8:14 AM   Result Value Ref Range    INR 1.15 0.85 - 1.15      EXAMINATION: Limited Abdominal Ultrasound, 1/5/2023 7:59 AM      COMPARISON: Abdominal ultrasound 5/24/2022     History: HCC screen; Cirrhosis of liver without ascites, unspecified hepatic cirrhosis type (H).      FINDINGS:  Pancreas: Visualized portions of the head and body of the pancreas are unremarkable. Pancreas is  partially obscured.     Liver: The liver measures 14.6 cm and demonstrates coarsened and nodular parenchyma. No evidence of a focal hepatic mass. The main portal vein is patent with antegrade flow.    US visualization score: A - No or minimal limitations     Gallbladder: Cholelithiasis without wall thickening, pericholecystic fluid, or positive sonographic Koch's sign.     Bile Ducts: Both the intra- and extrahepatic biliary system are of normal caliber.  The common bile duct measures 6 mm in diameter.     Right Kidney: Measures 10.9 cm in length with normal parenchymal echogenicity and cortical thickness. No hydronephrosis.     Fluid: None in the visualized abdomen.                                                                IMPRESSION:  1. Cirrhosis without focal lesion demonstrated.    a. LI-RADS US Category: US-1 Negative: No US evidence of HCC    b. Recommend continued surveillance US.  2. Cholelithiasis without sonographic evidence for acute cholecystitis.    IMPRESSION:  Ms. Starks has well-compensated cirrhosis caused by chronic hepatitis C for which she is a sustained virologic responder to hepatitis C treatment.  She did get a flu shot.  She is declining again the COVID-19 vaccines.  She is up to date with regard to variceal screening and other cancer screening as well.  I will not be making any change to her medical regimen.  I will see her back in the clinic in 6 months for repeat imaging and blood work.    I did spend total of 30 minutes (on the date of the encounter), including 20 minutes of face-to-face clinic time including counseling.  The rest of the time was spent in documentation and review of records.     Thank you very much for allowing me to participate in the care of this patient.  If you have any questions regarding recommendations, please do not hesitate to contact me.         Abbe Reynolds MD      Professor of Medicine  [employer]      Executive Medical  Director, Solid Organ Transplant Program  [facility]

## 2023-01-13 ENCOUNTER — TRANSFERRED RECORDS (OUTPATIENT)
Dept: HEALTH INFORMATION MANAGEMENT | Facility: CLINIC | Age: 73
End: 2023-01-13
Payer: COMMERCIAL

## 2023-02-14 ENCOUNTER — TRANSFERRED RECORDS (OUTPATIENT)
Dept: HEALTH INFORMATION MANAGEMENT | Facility: CLINIC | Age: 73
End: 2023-02-14
Payer: MEDICARE

## 2023-04-09 ENCOUNTER — HEALTH MAINTENANCE LETTER (OUTPATIENT)
Age: 73
End: 2023-04-09

## 2023-06-01 ENCOUNTER — HEALTH MAINTENANCE LETTER (OUTPATIENT)
Age: 73
End: 2023-06-01

## 2023-07-06 ENCOUNTER — LAB (OUTPATIENT)
Dept: LAB | Facility: CLINIC | Age: 73
End: 2023-07-06
Attending: INTERNAL MEDICINE
Payer: MEDICARE

## 2023-07-06 ENCOUNTER — ANCILLARY PROCEDURE (OUTPATIENT)
Dept: ULTRASOUND IMAGING | Facility: CLINIC | Age: 73
End: 2023-07-06
Attending: INTERNAL MEDICINE
Payer: MEDICARE

## 2023-07-06 ENCOUNTER — OFFICE VISIT (OUTPATIENT)
Dept: GASTROENTEROLOGY | Facility: CLINIC | Age: 73
End: 2023-07-06
Attending: INTERNAL MEDICINE
Payer: MEDICARE

## 2023-07-06 VITALS
OXYGEN SATURATION: 100 % | WEIGHT: 160.5 LBS | SYSTOLIC BLOOD PRESSURE: 129 MMHG | HEART RATE: 86 BPM | DIASTOLIC BLOOD PRESSURE: 75 MMHG | TEMPERATURE: 97.6 F | BODY MASS INDEX: 28.44 KG/M2

## 2023-07-06 DIAGNOSIS — K74.60 CIRRHOSIS OF LIVER WITHOUT ASCITES, UNSPECIFIED HEPATIC CIRRHOSIS TYPE (H): Primary | ICD-10-CM

## 2023-07-06 DIAGNOSIS — K74.60 CIRRHOSIS OF LIVER WITHOUT ASCITES, UNSPECIFIED HEPATIC CIRRHOSIS TYPE (H): ICD-10-CM

## 2023-07-06 LAB
AFP SERPL-MCNC: 2.6 NG/ML
ALBUMIN SERPL BCG-MCNC: 4.1 G/DL (ref 3.5–5.2)
ALP SERPL-CCNC: 88 U/L (ref 35–104)
ALT SERPL W P-5'-P-CCNC: 50 U/L (ref 0–50)
ANION GAP SERPL CALCULATED.3IONS-SCNC: 9 MMOL/L (ref 7–15)
AST SERPL W P-5'-P-CCNC: 47 U/L (ref 0–45)
BILIRUB DIRECT SERPL-MCNC: 0.34 MG/DL (ref 0–0.3)
BILIRUB SERPL-MCNC: 0.9 MG/DL
BUN SERPL-MCNC: 16.4 MG/DL (ref 8–23)
CALCIUM SERPL-MCNC: 9.2 MG/DL (ref 8.8–10.2)
CHLORIDE SERPL-SCNC: 107 MMOL/L (ref 98–107)
CREAT SERPL-MCNC: 0.68 MG/DL (ref 0.51–0.95)
DEPRECATED HCO3 PLAS-SCNC: 24 MMOL/L (ref 22–29)
ERYTHROCYTE [DISTWIDTH] IN BLOOD BY AUTOMATED COUNT: 13.4 % (ref 10–15)
GFR SERPL CREATININE-BSD FRML MDRD: >90 ML/MIN/1.73M2
GLUCOSE SERPL-MCNC: 181 MG/DL (ref 70–99)
HCT VFR BLD AUTO: 39.8 % (ref 35–47)
HGB BLD-MCNC: 13.3 G/DL (ref 11.7–15.7)
INR PPP: 1.12 (ref 0.85–1.15)
MCH RBC QN AUTO: 29.4 PG (ref 26.5–33)
MCHC RBC AUTO-ENTMCNC: 33.4 G/DL (ref 31.5–36.5)
MCV RBC AUTO: 88 FL (ref 78–100)
PLATELET # BLD AUTO: 84 10E3/UL (ref 150–450)
POTASSIUM SERPL-SCNC: 4.2 MMOL/L (ref 3.4–5.3)
PROT SERPL-MCNC: 6.4 G/DL (ref 6.4–8.3)
RBC # BLD AUTO: 4.53 10E6/UL (ref 3.8–5.2)
SODIUM SERPL-SCNC: 140 MMOL/L (ref 136–145)
WBC # BLD AUTO: 2.5 10E3/UL (ref 4–11)

## 2023-07-06 PROCEDURE — 76700 US EXAM ABDOM COMPLETE: CPT | Mod: GC | Performed by: RADIOLOGY

## 2023-07-06 PROCEDURE — 85027 COMPLETE CBC AUTOMATED: CPT | Performed by: PATHOLOGY

## 2023-07-06 PROCEDURE — 99214 OFFICE O/P EST MOD 30 MIN: CPT | Performed by: INTERNAL MEDICINE

## 2023-07-06 PROCEDURE — 82105 ALPHA-FETOPROTEIN SERUM: CPT | Performed by: INTERNAL MEDICINE

## 2023-07-06 PROCEDURE — 80053 COMPREHEN METABOLIC PANEL: CPT | Performed by: PATHOLOGY

## 2023-07-06 PROCEDURE — 99000 SPECIMEN HANDLING OFFICE-LAB: CPT | Performed by: PATHOLOGY

## 2023-07-06 PROCEDURE — G0463 HOSPITAL OUTPT CLINIC VISIT: HCPCS | Performed by: INTERNAL MEDICINE

## 2023-07-06 PROCEDURE — 36415 COLL VENOUS BLD VENIPUNCTURE: CPT | Performed by: PATHOLOGY

## 2023-07-06 PROCEDURE — 85610 PROTHROMBIN TIME: CPT | Performed by: PATHOLOGY

## 2023-07-06 PROCEDURE — 82248 BILIRUBIN DIRECT: CPT | Performed by: PATHOLOGY

## 2023-07-06 ASSESSMENT — PAIN SCALES - GENERAL: PAINLEVEL: NO PAIN (0)

## 2023-07-06 NOTE — PROGRESS NOTES
HISTORY OF PRESENT ILLNESS:  I had the pleasure of seeing Felisa Starks for followup in the Liver Clinic at the [facility] on 07/06/2023.  Ms. Starks returns for followup of cirrhosis caused by chronic hepatitis C.  She is a sustained virologic responder to hepatitis C treatment.    She is doing well at this visit.  She denies any abdominal pain.  She does have some dry skin about her elbow but no other itching or skin rash.  She reports her energy level is good.  She denies any increased abdominal girth.  She does have some mild lower extremity edema.  She has not had any gastrointestinal bleeding or any overt signs of hepatic encephalopathy.    She denies any fevers or chills, cough or shortness of breath.  She denies any nausea or vomiting, diarrhea or constipation.  Her appetite is good.  Her weight is exactly the same.  She is trying to lose some weight.    Current Outpatient Medications   Medication     albuterol (PROAIR HFA/PROVENTIL HFA/VENTOLIN HFA) 108 (90 Base) MCG/ACT inhaler     Ascorbic Acid (VITAMIN C) 500 MG CHEW     budesonide-formoterol (SYMBICORT) 160-4.5 MCG/ACT Inhaler     Calcium Carb-Cholecalciferol (CALCIUM 500 + D3 PO)     fluticasone (FLONASE) 50 MCG/ACT nasal spray     fluticasone (FLOVENT HFA) 220 MCG/ACT inhaler     ibuprofen (ADVIL/MOTRIN) 200 MG tablet     loperamide (IMODIUM A-D) 2 MG tablet     Multiple Vitamins-Minerals (MULTIVITAMIN ADULT PO)     omeprazole (PRILOSEC) 20 MG DR capsule     No current facility-administered medications for this visit.     /75   Pulse 86   Temp 97.6  F (36.4  C) (Oral)   Wt 72.8 kg (160 lb 8 oz)   SpO2 100%   BMI 28.44 kg/m      PHYSICAL EXAMINATION:    GENERAL:  She looks well.  HEENT:  No scleral icterus or temporal muscle wasting.  CHEST:  Clear.  ABDOMEN:  No increase in girth.  No masses or tenderness to palpation are present.  Her liver is 10 cm in span without left lobe enlargement.  No spleen tip is  palpable.  EXTREMITIES:  No edema.  SKIN:  No stigmata of chronic liver disease.  NEUROLOGIC:  No asterixis.    Recent Results (from the past 168 hour(s))   Hepatic Panel [LAB20]    Collection Time: 07/06/23  6:40 AM   Result Value Ref Range    Protein Total 6.4 6.4 - 8.3 g/dL    Albumin 4.1 3.5 - 5.2 g/dL    Bilirubin Total 0.9 <=1.2 mg/dL    Alkaline Phosphatase 88 35 - 104 U/L    AST 47 (H) 0 - 45 U/L    ALT 50 0 - 50 U/L    Bilirubin Direct 0.34 (H) 0.00 - 0.30 mg/dL   Basic metabolic panel [LAB15]    Collection Time: 07/06/23  6:40 AM   Result Value Ref Range    Sodium 140 136 - 145 mmol/L    Potassium 4.2 3.4 - 5.3 mmol/L    Chloride 107 98 - 107 mmol/L    Carbon Dioxide (CO2) 24 22 - 29 mmol/L    Anion Gap 9 7 - 15 mmol/L    Urea Nitrogen 16.4 8.0 - 23.0 mg/dL    Creatinine 0.68 0.51 - 0.95 mg/dL    Calcium 9.2 8.8 - 10.2 mg/dL    Glucose 181 (H) 70 - 99 mg/dL    GFR Estimate >90 >60 mL/min/1.73m2   CBC with platelets [[id number]]    Collection Time: 07/06/23  6:40 AM   Result Value Ref Range    WBC Count 2.5 (L) 4.0 - 11.0 10e3/uL    RBC Count 4.53 3.80 - 5.20 10e6/uL    Hemoglobin 13.3 11.7 - 15.7 g/dL    Hematocrit 39.8 35.0 - 47.0 %    MCV 88 78 - 100 fL    MCH 29.4 26.5 - 33.0 pg    MCHC 33.4 31.5 - 36.5 g/dL    RDW 13.4 10.0 - 15.0 %    Platelet Count 84 (L) 150 - 450 10e3/uL   INR [[id number]]    Collection Time: 07/06/23  6:40 AM   Result Value Ref Range    INR 1.12 0.85 - 1.15      IMPRESSION:  Ms. Starks has well-compensated cirrhosis caused by chronic hepatitis C.  She is doing well.  She is up to date with regard to cancer and variceal screening.  She is declining additional COVID-19 or flu vaccines.  She is up to date with regard to pneumococcal vaccination.  She is also due for bone density studies, which she will get back in [place].    Otherwise, I will not be making any other change in her medical regimen.  I will see her back in the clinic in 6 months for repeat imaging and blood work.    I  did spend a total of 30 minutes (on the date of the encounter), including 20 minutes of face-to-face clinic time including counseling. The rest of the time was spent in documentation and review of records.     Thank you very much for allowing me to participate in the care of this patient.  If you have any questions regarding recommendations, please do not hesitate to contact me.         Abbe Reynolds MD      Professor of Medicine  [employer]      Executive Medical Director, Solid Organ Transplant Program  [facility]

## 2023-07-06 NOTE — NURSING NOTE
Chief Complaint   Patient presents with     RECHECK     6 mo f/u       /75   Pulse 86   Temp 97.6  F (36.4  C) (Oral)   Wt 72.8 kg (160 lb 8 oz)   SpO2 100%   BMI 28.44 kg/m      Alfredo Rivera on 7/6/2023 at 8:25 AM

## 2023-07-06 NOTE — LETTER
7/6/2023         RE: Felisa Starks  [address]  [facility] [address]        Dear Colleague,    Thank you for referring your patient, Felisa tSarks, to the [facility]PATOLOG[facility] [place]. Please see a copy of my visit note below.    HISTORY OF PRESENT ILLNESS:  I had the pleasure of seeing Felisa Starks for followup in the Liver Clinic at the [facility] on 07/06/2023.  Ms. Starks returns for followup of cirrhosis caused by chronic hepatitis C.  She is a sustained virologic responder to hepatitis C treatment.    She is doing well at this visit.  She denies any abdominal pain.  She does have some dry skin about her elbow but no other itching or skin rash.  She reports her energy level is good.  She denies any increased abdominal girth.  She does have some mild lower extremity edema.  She has not had any gastrointestinal bleeding or any overt signs of hepatic encephalopathy.    She denies any fevers or chills, cough or shortness of breath.  She denies any nausea or vomiting, diarrhea or constipation.  Her appetite is good.  Her weight is exactly the same.  She is trying to lose some weight.    Current Outpatient Medications   Medication    albuterol (PROAIR HFA/PROVENTIL HFA/VENTOLIN HFA) 108 (90 Base) MCG/ACT inhaler    Ascorbic Acid (VITAMIN C) 500 MG CHEW    budesonide-formoterol (SYMBICORT) 160-4.5 MCG/ACT Inhaler    Calcium Carb-Cholecalciferol (CALCIUM 500 + D3 PO)    fluticasone (FLONASE) 50 MCG/ACT nasal spray    fluticasone (FLOVENT HFA) 220 MCG/ACT inhaler    ibuprofen (ADVIL/MOTRIN) 200 MG tablet    loperamide (IMODIUM A-D) 2 MG tablet    Multiple Vitamins-Minerals (MULTIVITAMIN ADULT PO)    omeprazole (PRILOSEC) 20 MG DR capsule     No current facility-administered medications for this visit.     /75   Pulse 86   Temp 97.6  F (36.4  C) (Oral)   Wt 72.8 kg (160 lb 8 oz)   SpO2 100%   BMI 28.44 kg/m      PHYSICAL EXAMINATION:    GENERAL:  She looks  well.  HEENT:  No scleral icterus or temporal muscle wasting.  CHEST:  Clear.  ABDOMEN:  No increase in girth.  No masses or tenderness to palpation are present.  Her liver is 10 cm in span without left lobe enlargement.  No spleen tip is palpable.  EXTREMITIES:  No edema.  SKIN:  No stigmata of chronic liver disease.  NEUROLOGIC:  No asterixis.    Recent Results (from the past 168 hour(s))   Hepatic Panel [LAB20]    Collection Time: 07/06/23  6:40 AM   Result Value Ref Range    Protein Total 6.4 6.4 - 8.3 g/dL    Albumin 4.1 3.5 - 5.2 g/dL    Bilirubin Total 0.9 <=1.2 mg/dL    Alkaline Phosphatase 88 35 - 104 U/L    AST 47 (H) 0 - 45 U/L    ALT 50 0 - 50 U/L    Bilirubin Direct 0.34 (H) 0.00 - 0.30 mg/dL   Basic metabolic panel [LAB15]    Collection Time: 07/06/23  6:40 AM   Result Value Ref Range    Sodium 140 136 - 145 mmol/L    Potassium 4.2 3.4 - 5.3 mmol/L    Chloride 107 98 - 107 mmol/L    Carbon Dioxide (CO2) 24 22 - 29 mmol/L    Anion Gap 9 7 - 15 mmol/L    Urea Nitrogen 16.4 8.0 - 23.0 mg/dL    Creatinine 0.68 0.51 - 0.95 mg/dL    Calcium 9.2 8.8 - 10.2 mg/dL    Glucose 181 (H) 70 - 99 mg/dL    GFR Estimate >90 >60 mL/min/1.73m2   CBC with platelets [WCP760]    Collection Time: 07/06/23  6:40 AM   Result Value Ref Range    WBC Count 2.5 (L) 4.0 - 11.0 10e3/uL    RBC Count 4.53 3.80 - 5.20 10e6/uL    Hemoglobin 13.3 11.7 - 15.7 g/dL    Hematocrit 39.8 35.0 - 47.0 %    MCV 88 78 - 100 fL    MCH 29.4 26.5 - 33.0 pg    MCHC 33.4 31.5 - 36.5 g/dL    RDW 13.4 10.0 - 15.0 %    Platelet Count 84 (L) 150 - 450 10e3/uL   INR [[id number]]    Collection Time: 07/06/23  6:40 AM   Result Value Ref Range    INR 1.12 0.85 - 1.15      IMPRESSION:  Ms. Starks has well-compensated cirrhosis caused by chronic hepatitis C.  She is doing well.  She is up to date with regard to cancer and variceal screening.  She is declining additional COVID-19 or flu vaccines.  She is up to date with regard to pneumococcal vaccination.  She is  also due for bone density studies, which she will get back in [place].    Otherwise, I will not be making any other change in her medical regimen.  I will see her back in the clinic in 6 months for repeat imaging and blood work.    I did spend a total of 30 minutes (on the date of the encounter), including 20 minutes of face-to-face clinic time including counseling. The rest of the time was spent in documentation and review of records.     Thank you very much for allowing me to participate in the care of this patient.  If you have any questions regarding recommendations, please do not hesitate to contact me.         Abbe Reynolds MD      Professor of Medicine  [employer] [facility] [employer]      Executive Medical Director, Solid Organ Transplant Program  [facility]

## 2024-01-04 ENCOUNTER — ANCILLARY PROCEDURE (OUTPATIENT)
Dept: ULTRASOUND IMAGING | Facility: CLINIC | Age: 74
End: 2024-01-04
Attending: INTERNAL MEDICINE
Payer: MEDICARE

## 2024-01-04 ENCOUNTER — LAB (OUTPATIENT)
Dept: LAB | Facility: CLINIC | Age: 74
End: 2024-01-04
Attending: INTERNAL MEDICINE
Payer: MEDICARE

## 2024-01-04 ENCOUNTER — OFFICE VISIT (OUTPATIENT)
Dept: GASTROENTEROLOGY | Facility: CLINIC | Age: 74
End: 2024-01-04
Attending: INTERNAL MEDICINE
Payer: MEDICARE

## 2024-01-04 VITALS
BODY MASS INDEX: 28.67 KG/M2 | WEIGHT: 161.8 LBS | SYSTOLIC BLOOD PRESSURE: 135 MMHG | OXYGEN SATURATION: 95 % | HEART RATE: 85 BPM | DIASTOLIC BLOOD PRESSURE: 80 MMHG

## 2024-01-04 DIAGNOSIS — E08.9 DIABETES MELLITUS DUE TO UNDERLYING CONDITION WITHOUT COMPLICATION, WITHOUT LONG-TERM CURRENT USE OF INSULIN (H): ICD-10-CM

## 2024-01-04 DIAGNOSIS — K74.60 CIRRHOSIS OF LIVER WITHOUT ASCITES, UNSPECIFIED HEPATIC CIRRHOSIS TYPE (H): ICD-10-CM

## 2024-01-04 DIAGNOSIS — K74.60 CIRRHOSIS OF LIVER WITHOUT ASCITES, UNSPECIFIED HEPATIC CIRRHOSIS TYPE (H): Primary | ICD-10-CM

## 2024-01-04 LAB
AFP SERPL-MCNC: 2.6 NG/ML
ALBUMIN SERPL BCG-MCNC: 3.8 G/DL (ref 3.5–5.2)
ALP SERPL-CCNC: 87 U/L (ref 40–150)
ALT SERPL W P-5'-P-CCNC: 49 U/L (ref 0–50)
ANION GAP SERPL CALCULATED.3IONS-SCNC: 8 MMOL/L (ref 7–15)
AST SERPL W P-5'-P-CCNC: 44 U/L (ref 0–45)
BILIRUB DIRECT SERPL-MCNC: 0.39 MG/DL (ref 0–0.3)
BILIRUB SERPL-MCNC: 1.2 MG/DL
BUN SERPL-MCNC: 14.3 MG/DL (ref 8–23)
CALCIUM SERPL-MCNC: 8.8 MG/DL (ref 8.8–10.2)
CHLORIDE SERPL-SCNC: 106 MMOL/L (ref 98–107)
CREAT SERPL-MCNC: 0.58 MG/DL (ref 0.51–0.95)
DEPRECATED HCO3 PLAS-SCNC: 26 MMOL/L (ref 22–29)
EGFRCR SERPLBLD CKD-EPI 2021: >90 ML/MIN/1.73M2
ERYTHROCYTE [DISTWIDTH] IN BLOOD BY AUTOMATED COUNT: 13.1 % (ref 10–15)
GLUCOSE SERPL-MCNC: 201 MG/DL (ref 70–99)
HBA1C MFR BLD: 8 %
HCT VFR BLD AUTO: 38.6 % (ref 35–47)
HGB BLD-MCNC: 13.3 G/DL (ref 11.7–15.7)
INR PPP: 1.2 (ref 0.85–1.15)
MCH RBC QN AUTO: 29.4 PG (ref 26.5–33)
MCHC RBC AUTO-ENTMCNC: 34.5 G/DL (ref 31.5–36.5)
MCV RBC AUTO: 85 FL (ref 78–100)
PLATELET # BLD AUTO: 73 10E3/UL (ref 150–450)
POTASSIUM SERPL-SCNC: 3.8 MMOL/L (ref 3.4–5.3)
PROT SERPL-MCNC: 6.1 G/DL (ref 6.4–8.3)
RBC # BLD AUTO: 4.52 10E6/UL (ref 3.8–5.2)
SODIUM SERPL-SCNC: 140 MMOL/L (ref 135–145)
WBC # BLD AUTO: 2.3 10E3/UL (ref 4–11)

## 2024-01-04 PROCEDURE — 99214 OFFICE O/P EST MOD 30 MIN: CPT | Performed by: INTERNAL MEDICINE

## 2024-01-04 PROCEDURE — 80053 COMPREHEN METABOLIC PANEL: CPT | Performed by: PATHOLOGY

## 2024-01-04 PROCEDURE — 82105 ALPHA-FETOPROTEIN SERUM: CPT | Performed by: INTERNAL MEDICINE

## 2024-01-04 PROCEDURE — 36415 COLL VENOUS BLD VENIPUNCTURE: CPT | Performed by: PATHOLOGY

## 2024-01-04 PROCEDURE — G0463 HOSPITAL OUTPT CLINIC VISIT: HCPCS | Performed by: INTERNAL MEDICINE

## 2024-01-04 PROCEDURE — 82248 BILIRUBIN DIRECT: CPT | Performed by: PATHOLOGY

## 2024-01-04 PROCEDURE — 85610 PROTHROMBIN TIME: CPT | Performed by: PATHOLOGY

## 2024-01-04 PROCEDURE — 85027 COMPLETE CBC AUTOMATED: CPT | Performed by: PATHOLOGY

## 2024-01-04 PROCEDURE — 83036 HEMOGLOBIN GLYCOSYLATED A1C: CPT | Performed by: INTERNAL MEDICINE

## 2024-01-04 PROCEDURE — 99000 SPECIMEN HANDLING OFFICE-LAB: CPT | Performed by: PATHOLOGY

## 2024-01-04 PROCEDURE — 76705 ECHO EXAM OF ABDOMEN: CPT | Mod: GC | Performed by: RADIOLOGY

## 2024-01-04 ASSESSMENT — PAIN SCALES - GENERAL: PAINLEVEL: NO PAIN (0)

## 2024-01-04 NOTE — PROGRESS NOTES
Hepatology Follow-Up Visit:     HISTORY OF PRESENT ILLNESS:   I had the pleasure of seeing Felisa Starks for followup in the Liver Clinic at the [facility] on January 4, 2024. Ms. Starks returns for followup of cirrhosis caused by chronic hepatitis C.  She is a sustained virologic responder to hepatitis C treatment.     She is doing well at this visit.  She denies any abdominal pain.  She denies any itching or skin rash.  She reports her energy level is good.  She denies any increased abdominal girth.  She does have some mild lower extremity edema.  She has not had any gastrointestinal bleeding or any overt signs of hepatic encephalopathy.     She denies any fevers or chills, cough or shortness of breath.  She denies any nausea or vomiting, diarrhea or constipation.  Her appetite is good.  Her weight is exactly the same.  She does carry a diagnosis of diabetes mellitus but is on no medication.  She is trying to lose some weight.    Medications:   Current Outpatient Medications   Medication    albuterol (PROAIR HFA/PROVENTIL HFA/VENTOLIN HFA) 108 (90 Base) MCG/ACT inhaler    Ascorbic Acid (VITAMIN C) 500 MG CHEW    Calcium Carb-Cholecalciferol (CALCIUM 500 + D3 PO)    ibuprofen (ADVIL/MOTRIN) 200 MG tablet    loperamide (IMODIUM A-D) 2 MG tablet    Multiple Vitamins-Minerals (MULTIVITAMIN ADULT PO)    omeprazole (PRILOSEC) 20 MG DR capsule    budesonide-formoterol (SYMBICORT) 160-4.5 MCG/ACT Inhaler    fluticasone (FLONASE) 50 MCG/ACT nasal spray    fluticasone (FLOVENT HFA) 220 MCG/ACT inhaler     No current facility-administered medications for this visit.      Vitals:   /80   Pulse 85   Wt 73.4 kg (161 lb 12.8 oz)   SpO2 95%   BMI 28.67 kg/m      Physical Exam:   In general SHE looks quite well. HEENT exam shows no scleral icterus or temporal muscle wasting. Chest is clear. Abdominal exam shows no increase in girth. No masses or tenderness to palpation are present. Liver is  10-11 cm in span without left lobe enlargement. No spleen tip is palpable. Extremity exam shows no edema. Skin exam shows no stigmata of chronic liver disease. Neurologic exam shows no asterixis.     Labs:   Lab Results   Component Value Date     01/04/2024    POTASSIUM 3.8 01/04/2024    CHLORIDE 106 01/04/2024    ANIONGAP 8 01/04/2024    CO2 26 01/04/2024    BUN 14.3 01/04/2024    CR 0.58 01/04/2024    GFRESTIMATED >90 01/04/2024    SANFORD 8.8 01/04/2024      Lab Results   Component Value Date    WBC 2.3 (L) 01/04/2024    HGB 13.3 01/04/2024    HCT 38.6 01/04/2024    MCV 85 01/04/2024    MCH 29.4 01/04/2024    MCHC 34.5 01/04/2024    RDW 13.1 01/04/2024    PLT 73 (L) 01/04/2024     Lab Results   Component Value Date    ALBUMIN 3.8 01/04/2024    ALKPHOS 87 01/04/2024    AST 44 01/04/2024    BILICONJ 0.0 01/15/2014     Lab Results   Component Value Date    INR 1.20 (H) 01/04/2024     MELD 3.0: 10 at 1/4/2024  8:20 AM  MELD-Na: 9 at 1/4/2024  8:20 AM  Calculated from:  Serum Creatinine: 0.58 mg/dL (Using min of 1 mg/dL) at 1/4/2024  8:20 AM  Serum Sodium: 140 mmol/L (Using max of 137 mmol/L) at 1/4/2024  8:20 AM  Total Bilirubin: 1.2 mg/dL at 1/4/2024  8:20 AM  Serum Albumin: 3.8 g/dL (Using max of 3.5 g/dL) at 1/4/2024  8:20 AM  INR(ratio): 1.20 at 1/4/2024  8:20 AM  Age at listing (hypothetical): 73 years  Sex: Female at 1/4/2024  8:20 AM    Imaging:   No images are attached to the encounter.     Assessment/Plan:   IMPRESSION:   Ms. Starks has well-compensated cirrhosis caused by chronic hepatitis C.  She is doing well.  She is up to date with regard to cancer and variceal screening.  She is declining additional COVID-19 or flu vaccines.  She is up to date with regard to pneumococcal vaccination.  I have checked a hemoglobin A1c which is still pending to better assess the status of her diabetes     Otherwise, I will not be making any other change in her medical regimen.  I will see her back in the clinic in 6  months for repeat imaging and blood work.     I did spend a total of 30 minutes (on the date of the encounter), including 20 minutes of face-to-face clinic time including counseling. The rest of the time was spent in documentation and review of records.    Thank you very much for allowing me to participate in the care of this patient.  If you have any questions regarding my recommendations, please do not hesitate to contact me.      Sincerely,       Abbe Reynolds MD      Professor of Medicine  [facility] [facility] [employer]      Executive Medical Director, Solid Organ Transplant Program  [facility]

## 2024-01-04 NOTE — NURSING NOTE
Chief Complaint   Patient presents with    RECHECK     Recheck       /80   Pulse 85   Wt 73.4 kg (161 lb 12.8 oz)   SpO2 95%   BMI 28.67 kg/m    Denisse Frye on 1/4/2024 at 8:38 AM

## 2024-01-04 NOTE — LETTER
1/4/2024         RE: Felisa Starks  [address]  [facility] [address]        Dear Colleague,    Thank you for referring your patient, Felisa Starks, to the [facility]PATOLOG[facility] [place]. Please see a copy of my visit note below.    Hepatology Follow-Up Visit:     HISTORY OF PRESENT ILLNESS:   I had the pleasure of seeing Felisa Starks for followup in the Liver Clinic at the [facility] on January 4, 2024. Ms. Starks returns for followup of cirrhosis caused by chronic hepatitis C.  She is a sustained virologic responder to hepatitis C treatment.     She is doing well at this visit.  She denies any abdominal pain.  She denies any itching or skin rash.  She reports her energy level is good.  She denies any increased abdominal girth.  She does have some mild lower extremity edema.  She has not had any gastrointestinal bleeding or any overt signs of hepatic encephalopathy.     She denies any fevers or chills, cough or shortness of breath.  She denies any nausea or vomiting, diarrhea or constipation.  Her appetite is good.  Her weight is exactly the same.  She does carry a diagnosis of diabetes mellitus but is on no medication.  She is trying to lose some weight.    Medications:   Current Outpatient Medications   Medication     albuterol (PROAIR HFA/PROVENTIL HFA/VENTOLIN HFA) 108 (90 Base) MCG/ACT inhaler     Ascorbic Acid (VITAMIN C) 500 MG CHEW     Calcium Carb-Cholecalciferol (CALCIUM 500 + D3 PO)     ibuprofen (ADVIL/MOTRIN) 200 MG tablet     loperamide (IMODIUM A-D) 2 MG tablet     Multiple Vitamins-Minerals (MULTIVITAMIN ADULT PO)     omeprazole (PRILOSEC) 20 MG DR capsule     budesonide-formoterol (SYMBICORT) 160-4.5 MCG/ACT Inhaler     fluticasone (FLONASE) 50 MCG/ACT nasal spray     fluticasone (FLOVENT HFA) 220 MCG/ACT inhaler     No current facility-administered medications for this visit.      Vitals:   /80   Pulse 85   Wt 73.4 kg (161 lb 12.8 oz)   SpO2  95%   BMI 28.67 kg/m      Physical Exam:   In general SHE looks quite well. HEENT exam shows no scleral icterus or temporal muscle wasting. Chest is clear. Abdominal exam shows no increase in girth. No masses or tenderness to palpation are present. Liver is 10-11 cm in span without left lobe enlargement. No spleen tip is palpable. Extremity exam shows no edema. Skin exam shows no stigmata of chronic liver disease. Neurologic exam shows no asterixis.     Labs:   Lab Results   Component Value Date     01/04/2024    POTASSIUM 3.8 01/04/2024    CHLORIDE 106 01/04/2024    ANIONGAP 8 01/04/2024    CO2 26 01/04/2024    BUN 14.3 01/04/2024    CR 0.58 01/04/2024    GFRESTIMATED >90 01/04/2024    SANFORD 8.8 01/04/2024      Lab Results   Component Value Date    WBC 2.3 (L) 01/04/2024    HGB 13.3 01/04/2024    HCT 38.6 01/04/2024    MCV 85 01/04/2024    MCH 29.4 01/04/2024    MCHC 34.5 01/04/2024    RDW 13.1 01/04/2024    PLT 73 (L) 01/04/2024     Lab Results   Component Value Date    ALBUMIN 3.8 01/04/2024    ALKPHOS 87 01/04/2024    AST 44 01/04/2024    BILICONJ 0.0 01/15/2014     Lab Results   Component Value Date    INR 1.20 (H) 01/04/2024     MELD 3.0: 10 at 1/4/2024  8:20 AM  MELD-Na: 9 at 1/4/2024  8:20 AM  Calculated from:  Serum Creatinine: 0.58 mg/dL (Using min of 1 mg/dL) at 1/4/2024  8:20 AM  Serum Sodium: 140 mmol/L (Using max of 137 mmol/L) at 1/4/2024  8:20 AM  Total Bilirubin: 1.2 mg/dL at 1/4/2024  8:20 AM  Serum Albumin: 3.8 g/dL (Using max of 3.5 g/dL) at 1/4/2024  8:20 AM  INR(ratio): 1.20 at 1/4/2024  8:20 AM  Age at listing (hypothetical): 73 years  Sex: Female at 1/4/2024  8:20 AM    Imaging:   No images are attached to the encounter.     Assessment/Plan:   IMPRESSION:   Ms. Starks has well-compensated cirrhosis caused by chronic hepatitis C.  She is doing well.  She is up to date with regard to cancer and variceal screening.  She is declining additional COVID-19 or flu vaccines.  She is up to date  with regard to pneumococcal vaccination.  I have checked a hemoglobin A1c which is still pending to better assess the status of her diabetes     Otherwise, I will not be making any other change in her medical regimen.  I will see her back in the clinic in 6 months for repeat imaging and blood work.     I did spend a total of 30 minutes (on the date of the encounter), including 20 minutes of face-to-face clinic time including counseling. The rest of the time was spent in documentation and review of records.    Thank you very much for allowing me to participate in the care of this patient.  If you have any questions regarding my recommendations, please do not hesitate to contact me.      Sincerely,       Abbe Reynolds MD      Professor of Medicine  [employer]      Executive Medical Director, Solid Organ Transplant Program  [facility]

## 2024-07-09 ENCOUNTER — OFFICE VISIT (OUTPATIENT)
Dept: GASTROENTEROLOGY | Facility: CLINIC | Age: 74
End: 2024-07-09
Attending: INTERNAL MEDICINE
Payer: MEDICARE

## 2024-07-09 ENCOUNTER — ANCILLARY PROCEDURE (OUTPATIENT)
Dept: ULTRASOUND IMAGING | Facility: CLINIC | Age: 74
End: 2024-07-09
Attending: INTERNAL MEDICINE
Payer: MEDICARE

## 2024-07-09 ENCOUNTER — LAB (OUTPATIENT)
Dept: LAB | Facility: CLINIC | Age: 74
End: 2024-07-09
Attending: INTERNAL MEDICINE
Payer: MEDICARE

## 2024-07-09 VITALS
DIASTOLIC BLOOD PRESSURE: 75 MMHG | BODY MASS INDEX: 28.37 KG/M2 | OXYGEN SATURATION: 97 % | HEART RATE: 88 BPM | SYSTOLIC BLOOD PRESSURE: 124 MMHG | TEMPERATURE: 97.7 F | WEIGHT: 160.1 LBS

## 2024-07-09 DIAGNOSIS — K74.60 CIRRHOSIS OF LIVER WITHOUT ASCITES, UNSPECIFIED HEPATIC CIRRHOSIS TYPE (H): ICD-10-CM

## 2024-07-09 DIAGNOSIS — K74.60 CIRRHOSIS OF LIVER WITHOUT ASCITES, UNSPECIFIED HEPATIC CIRRHOSIS TYPE (H): Primary | ICD-10-CM

## 2024-07-09 LAB
AFP SERPL-MCNC: 2.2 NG/ML
ALBUMIN SERPL BCG-MCNC: 3.8 G/DL (ref 3.5–5.2)
ALP SERPL-CCNC: 74 U/L (ref 40–150)
ALT SERPL W P-5'-P-CCNC: 58 U/L (ref 0–50)
ANION GAP SERPL CALCULATED.3IONS-SCNC: 8 MMOL/L (ref 7–15)
AST SERPL W P-5'-P-CCNC: 51 U/L (ref 0–45)
BILIRUB DIRECT SERPL-MCNC: 0.28 MG/DL (ref 0–0.3)
BILIRUB SERPL-MCNC: 0.8 MG/DL
BUN SERPL-MCNC: 16.7 MG/DL (ref 8–23)
CALCIUM SERPL-MCNC: 9.1 MG/DL (ref 8.8–10.2)
CHLORIDE SERPL-SCNC: 107 MMOL/L (ref 98–107)
CREAT SERPL-MCNC: 0.67 MG/DL (ref 0.51–0.95)
DEPRECATED HCO3 PLAS-SCNC: 26 MMOL/L (ref 22–29)
EGFRCR SERPLBLD CKD-EPI 2021: >90 ML/MIN/1.73M2
ERYTHROCYTE [DISTWIDTH] IN BLOOD BY AUTOMATED COUNT: 13.4 % (ref 10–15)
GLUCOSE SERPL-MCNC: 152 MG/DL (ref 70–99)
HCT VFR BLD AUTO: 39.5 % (ref 35–47)
HGB BLD-MCNC: 13.2 G/DL (ref 11.7–15.7)
INR PPP: 1.11 (ref 0.85–1.15)
MCH RBC QN AUTO: 29.3 PG (ref 26.5–33)
MCHC RBC AUTO-ENTMCNC: 33.4 G/DL (ref 31.5–36.5)
MCV RBC AUTO: 88 FL (ref 78–100)
PLATELET # BLD AUTO: 76 10E3/UL (ref 150–450)
POTASSIUM SERPL-SCNC: 4.1 MMOL/L (ref 3.4–5.3)
PROT SERPL-MCNC: 6 G/DL (ref 6.4–8.3)
RBC # BLD AUTO: 4.5 10E6/UL (ref 3.8–5.2)
SODIUM SERPL-SCNC: 141 MMOL/L (ref 135–145)
WBC # BLD AUTO: 2.7 10E3/UL (ref 4–11)

## 2024-07-09 PROCEDURE — 82105 ALPHA-FETOPROTEIN SERUM: CPT | Performed by: INTERNAL MEDICINE

## 2024-07-09 PROCEDURE — 76705 ECHO EXAM OF ABDOMEN: CPT | Mod: GC | Performed by: STUDENT IN AN ORGANIZED HEALTH CARE EDUCATION/TRAINING PROGRAM

## 2024-07-09 PROCEDURE — 85610 PROTHROMBIN TIME: CPT | Performed by: PATHOLOGY

## 2024-07-09 PROCEDURE — 85027 COMPLETE CBC AUTOMATED: CPT | Performed by: PATHOLOGY

## 2024-07-09 PROCEDURE — 99000 SPECIMEN HANDLING OFFICE-LAB: CPT | Performed by: PATHOLOGY

## 2024-07-09 PROCEDURE — G0463 HOSPITAL OUTPT CLINIC VISIT: HCPCS | Performed by: INTERNAL MEDICINE

## 2024-07-09 PROCEDURE — 80053 COMPREHEN METABOLIC PANEL: CPT | Performed by: PATHOLOGY

## 2024-07-09 PROCEDURE — 36415 COLL VENOUS BLD VENIPUNCTURE: CPT | Performed by: PATHOLOGY

## 2024-07-09 PROCEDURE — 99214 OFFICE O/P EST MOD 30 MIN: CPT | Performed by: INTERNAL MEDICINE

## 2024-07-09 PROCEDURE — 82248 BILIRUBIN DIRECT: CPT | Performed by: PATHOLOGY

## 2024-07-09 ASSESSMENT — PAIN SCALES - GENERAL: PAINLEVEL: WORST PAIN (10)

## 2024-07-09 NOTE — NURSING NOTE
Chief Complaint   Patient presents with    RECHECK     Cirrhosis follow up     Vitals:    07/09/24 0825   BP: 124/75   BP Location: Right arm   Patient Position: Sitting   Cuff Size: Adult Regular   Pulse: 88   Temp: 97.7  F (36.5  C)   TempSrc: Oral   SpO2: 97%   Weight: 72.6 kg (160 lb 1.6 oz)       BP Readings from Last 3 Encounters:   07/09/24 124/75   01/04/24 135/80   07/06/23 129/75       /75 (BP Location: Right arm, Patient Position: Sitting, Cuff Size: Adult Regular)   Pulse 88   Temp 97.7  F (36.5  C) (Oral)   Wt 72.6 kg (160 lb 1.6 oz)   SpO2 97%   BMI 28.37 kg/m       Denisse Frye     nlhp

## 2024-07-09 NOTE — LETTER
7/9/2024      Felisa Starks  [address]  [facility] [address]      Dear Colleague,    Thank you for referring your patient, Felisa Starks, to the [facility] [place]. Please see a copy of my visit note below.    Hepatology Follow-Up Visit:     HISTORY OF PRESENT ILLNESS:   I had the pleasure of seeing Felisa Starks for followup in the Liver Clinic at the [facility] on July 9, 2024. Ms. Starks returns for followup of cirrhosis caused by chronic hepatitis C.  She is a sustained virologic responder to hepatitis C treatment.     She is doing well at this visit.  She denies any abdominal pain.  She denies any itching or skin rash.  She reports her energy level is good.  She denies any increased abdominal girth.  She does have some mild lower extremity edema.  She did injure her left ankle and does note more edema there.    She has not had any gastrointestinal bleeding or any overt signs of hepatic encephalopathy.     She denies any fevers or chills, cough or shortness of breath.  She denies any nausea or vomiting, diarrhea or constipation.  Her appetite is good.  Her weight is exactly the same.  She does carry a diagnosis of diabetes mellitus but is on no medication.  She is trying to lose some weight.    Medications:   Current Outpatient Medications   Medication Sig Dispense Refill     Ascorbic Acid (VITAMIN C) 500 MG CHEW Take 1 tablet by mouth as needed (takes when she feels she needs it)       Calcium Carb-Cholecalciferol (CALCIUM 500 + D3 PO) Take 1 tablet by mouth daily as needed (takes when she feels she needs it.)       ibuprofen (ADVIL/MOTRIN) 200 MG tablet Take 200 mg by mouth every 4 hours as needed for mild pain       loperamide (IMODIUM A-D) 2 MG tablet Take 2 mg by mouth 4 times daily as needed for diarrhea       Multiple Vitamins-Minerals (MULTIVITAMIN ADULT PO) Take 1 capsule by mouth as needed       omeprazole (PRILOSEC) 20 MG DR capsule Take 20 mg by  mouth       albuterol (PROAIR HFA/PROVENTIL HFA/VENTOLIN HFA) 108 (90 Base) MCG/ACT inhaler Inhale 2 puffs into the lungs every 6 hours as needed for shortness of breath / dyspnea or wheezing (Patient not taking: Reported on 7/9/2024)       budesonide-formoterol (SYMBICORT) 160-4.5 MCG/ACT Inhaler Symbicort 160 mcg-4.5 mcg/actuation HFA aerosol inhaler (Patient not taking: Reported on 1/4/2024)       fluticasone (FLONASE) 50 MCG/ACT nasal spray  (Patient not taking: Reported on 1/4/2024)       fluticasone (FLOVENT HFA) 220 MCG/ACT inhaler Inhale 2 puffs into the lungs 2 times daily (Patient not taking: Reported on 1/4/2024)       No current facility-administered medications for this visit.      .  PalpableVitals:   /75 (BP Location: Right arm, Patient Position: Sitting, Cuff Size: Adult Regular)   Pulse 88   Temp 97.7  F (36.5  C) (Oral)   Wt 72.6 kg (160 lb 1.6 oz)   SpO2 97%   BMI 28.37 kg/m      Physical Exam:   In general she looks quite well. HEENT exam shows no scleral icterus or temporal muscle wasting. Chest is clear. Abdominal exam shows no increase in girth. No masses or tenderness to palpation are present. Liver is 10 cm in span without left lobe enlargement. No spleen tip is palpable. Extremity exam shows no edema. Skin exam shows no stigmata of chronic liver disease. Neurologic exam shows no asterixis.   For  Labs:   Recent Results (from the past 168 hour(s))   Hepatic Panel [LAB20]    Collection Time: 07/09/24  6:21 AM   Result Value Ref Range    Protein Total 6.0 (L) 6.4 - 8.3 g/dL    Albumin 3.8 3.5 - 5.2 g/dL    Bilirubin Total 0.8 <=1.2 mg/dL    Alkaline Phosphatase 74 40 - 150 U/L    AST 51 (H) 0 - 45 U/L    ALT 58 (H) 0 - 50 U/L    Bilirubin Direct 0.28 0.00 - 0.30 mg/dL   Basic metabolic panel [LAB15]    Collection Time: 07/09/24  6:21 AM   Result Value Ref Range    Sodium 141 135 - 145 mmol/L    Potassium 4.1 3.4 - 5.3 mmol/L    Chloride 107 98 - 107 mmol/L    Carbon Dioxide (CO2) 26  22 - 29 mmol/L    Anion Gap 8 7 - 15 mmol/L    Urea Nitrogen 16.7 8.0 - 23.0 mg/dL    Creatinine 0.67 0.51 - 0.95 mg/dL    GFR Estimate >90 >60 mL/min/1.73m2    Calcium 9.1 8.8 - 10.2 mg/dL    Glucose 152 (H) 70 - 99 mg/dL   CBC with platelets [[id number]]    Collection Time: 07/09/24  6:21 AM   Result Value Ref Range    WBC Count 2.7 (L) 4.0 - 11.0 10e3/uL    RBC Count 4.50 3.80 - 5.20 10e6/uL    Hemoglobin 13.2 11.7 - 15.7 g/dL    Hematocrit 39.5 35.0 - 47.0 %    MCV 88 78 - 100 fL    MCH 29.3 26.5 - 33.0 pg    MCHC 33.4 31.5 - 36.5 g/dL    RDW 13.4 10.0 - 15.0 %    Platelet Count 76 (L) 150 - 450 10e3/uL   INR [[id number]]    Collection Time: 07/09/24  6:21 AM   Result Value Ref Range    INR 1.11 0.85 - 1.15      Imaging:   EXAMINATION: US ABDOMEN LIMITED, 7/9/2024 7:27 AM      INDICATION: Patient at high risk for HCC. HCC screen; Cirrhosis of liver without ascites, unspecified hepatic cirrhosis type (H)     COMPARISON: 1/4/2024     TECHNIQUE: The abdomen right upper quadrant was scanned in the standard fashion with specialized ultrasound transducer(s) using both gray scale and limited color/spectral Doppler techniques.     FINDINGS:   Fluid: No evidence of ascites or pleural effusions.     Liver: The liver demonstrates nodular contour and coarse echotexture, measuring 16.1 cm in craniocaudal dimension. No focal hepatic observation. No intrahepatic biliary dilatation. The main portal vein is patent with antegrade flow.     US visualization score: A - No or minimal limitations     Gallbladder: Gallstones. Gallbladder wall measures 0.2 cm. Nonhyperemic wall.     Bile Ducts: Normal caliber intra and extrahepatic biliary tree. The common bile duct measures 4 mm in diameter.     Pancreas: Visualized portions of the pancreas are unremarkable.      Kidney: The right kidney measures 9.2 cm in long dimension. No hydronephrosis, hydroureter, shadowing renal calculi, or solid mass. The capsule and parenchyma demonstrate normal  echogenicity.     Aorta and IVC: The visualized portions of the aorta and IVC are unremarkable.                                                                     IMPRESSION:   1. Cirrhotic morphology without focal liver observation.  a. LI-RADS US Category: US-1 Negative: No US evidence of HCC  b. Recommend continued surveillance US.  2. Cholelithiasis. No acute cholecystitis.    Assessment/Plan:   IMPRESSION:   Ms. Starks has well-compensated cirrhosis caused by chronic hepatitis C.  She is doing well.  She is up to date with regard to cancer and variceal screening.  She is declining additional COVID-19, RSV or flu vaccines.  She is up to date with regard to pneumococcal vaccination.  She did have recent variceal screening, which looked OK.     Otherwise, I will not be making any other change in her medical regimen.  I will see her back in the clinic in 6 months for repeat imaging and blood work.     I did spend a total of 30 minutes (on the date of the encounter), including 20 minutes of face-to-face clinic time including counseling. The rest of the time was spent in documentation and review of records.    Thank you very much for allowing me to participate in the care of this patient.  If you have any questions regarding my recommendations, please do not hesitate to contact me.      Sincerely,       Abbe Reynolds MD      Professor of Medicine  [employer]      Executive Medical Director, Solid Organ Transplant Program  [facility]        Again, thank you for allowing me to participate in the care of your patient.        Sincerely,        Abbe Reynolds MD

## 2024-07-09 NOTE — PROGRESS NOTES
Hepatology Follow-Up Visit:     HISTORY OF PRESENT ILLNESS:   I had the pleasure of seeing Felisa Starks for followup in the Liver Clinic at the [facility] on July 9, 2024. Ms. Starks returns for followup of cirrhosis caused by chronic hepatitis C.  She is a sustained virologic responder to hepatitis C treatment.     She is doing well at this visit.  She denies any abdominal pain.  She denies any itching or skin rash.  She reports her energy level is good.  She denies any increased abdominal girth.  She does have some mild lower extremity edema.  She did injure her left ankle and does note more edema there.    She has not had any gastrointestinal bleeding or any overt signs of hepatic encephalopathy.     She denies any fevers or chills, cough or shortness of breath.  She denies any nausea or vomiting, diarrhea or constipation.  Her appetite is good.  Her weight is exactly the same.  She does carry a diagnosis of diabetes mellitus but is on no medication.  She is trying to lose some weight.    Medications:   Current Outpatient Medications   Medication Sig Dispense Refill    Ascorbic Acid (VITAMIN C) 500 MG CHEW Take 1 tablet by mouth as needed (takes when she feels she needs it)      Calcium Carb-Cholecalciferol (CALCIUM 500 + D3 PO) Take 1 tablet by mouth daily as needed (takes when she feels she needs it.)      ibuprofen (ADVIL/MOTRIN) 200 MG tablet Take 200 mg by mouth every 4 hours as needed for mild pain      loperamide (IMODIUM A-D) 2 MG tablet Take 2 mg by mouth 4 times daily as needed for diarrhea      Multiple Vitamins-Minerals (MULTIVITAMIN ADULT PO) Take 1 capsule by mouth as needed      omeprazole (PRILOSEC) 20 MG DR capsule Take 20 mg by mouth      albuterol (PROAIR HFA/PROVENTIL HFA/VENTOLIN HFA) 108 (90 Base) MCG/ACT inhaler Inhale 2 puffs into the lungs every 6 hours as needed for shortness of breath / dyspnea or wheezing (Patient not taking: Reported on 7/9/2024)       budesonide-formoterol (SYMBICORT) 160-4.5 MCG/ACT Inhaler Symbicort 160 mcg-4.5 mcg/actuation HFA aerosol inhaler (Patient not taking: Reported on 1/4/2024)      fluticasone (FLONASE) 50 MCG/ACT nasal spray  (Patient not taking: Reported on 1/4/2024)      fluticasone (FLOVENT HFA) 220 MCG/ACT inhaler Inhale 2 puffs into the lungs 2 times daily (Patient not taking: Reported on 1/4/2024)       No current facility-administered medications for this visit.      .  PalpableVitals:   /75 (BP Location: Right arm, Patient Position: Sitting, Cuff Size: Adult Regular)   Pulse 88   Temp 97.7  F (36.5  C) (Oral)   Wt 72.6 kg (160 lb 1.6 oz)   SpO2 97%   BMI 28.37 kg/m      Physical Exam:   In general she looks quite well. HEENT exam shows no scleral icterus or temporal muscle wasting. Chest is clear. Abdominal exam shows no increase in girth. No masses or tenderness to palpation are present. Liver is 10 cm in span without left lobe enlargement. No spleen tip is palpable. Extremity exam shows no edema. Skin exam shows no stigmata of chronic liver disease. Neurologic exam shows no asterixis.   For  Labs:   Recent Results (from the past 168 hour(s))   Hepatic Panel [LAB20]    Collection Time: 07/09/24  6:21 AM   Result Value Ref Range    Protein Total 6.0 (L) 6.4 - 8.3 g/dL    Albumin 3.8 3.5 - 5.2 g/dL    Bilirubin Total 0.8 <=1.2 mg/dL    Alkaline Phosphatase 74 40 - 150 U/L    AST 51 (H) 0 - 45 U/L    ALT 58 (H) 0 - 50 U/L    Bilirubin Direct 0.28 0.00 - 0.30 mg/dL   Basic metabolic panel [LAB15]    Collection Time: 07/09/24  6:21 AM   Result Value Ref Range    Sodium 141 135 - 145 mmol/L    Potassium 4.1 3.4 - 5.3 mmol/L    Chloride 107 98 - 107 mmol/L    Carbon Dioxide (CO2) 26 22 - 29 mmol/L    Anion Gap 8 7 - 15 mmol/L    Urea Nitrogen 16.7 8.0 - 23.0 mg/dL    Creatinine 0.67 0.51 - 0.95 mg/dL    GFR Estimate >90 >60 mL/min/1.73m2    Calcium 9.1 8.8 - 10.2 mg/dL    Glucose 152 (H) 70 - 99 mg/dL   CBC with  platelets [THD644]    Collection Time: 07/09/24  6:21 AM   Result Value Ref Range    WBC Count 2.7 (L) 4.0 - 11.0 10e3/uL    RBC Count 4.50 3.80 - 5.20 10e6/uL    Hemoglobin 13.2 11.7 - 15.7 g/dL    Hematocrit 39.5 35.0 - 47.0 %    MCV 88 78 - 100 fL    MCH 29.3 26.5 - 33.0 pg    MCHC 33.4 31.5 - 36.5 g/dL    RDW 13.4 10.0 - 15.0 %    Platelet Count 76 (L) 150 - 450 10e3/uL   INR [[id number]]    Collection Time: 07/09/24  6:21 AM   Result Value Ref Range    INR 1.11 0.85 - 1.15      Imaging:   EXAMINATION: US ABDOMEN LIMITED, 7/9/2024 7:27 AM      INDICATION: Patient at high risk for HCC. HCC screen; Cirrhosis of liver without ascites, unspecified hepatic cirrhosis type (H)     COMPARISON: 1/4/2024     TECHNIQUE: The abdomen right upper quadrant was scanned in the standard fashion with specialized ultrasound transducer(s) using both gray scale and limited color/spectral Doppler techniques.     FINDINGS:   Fluid: No evidence of ascites or pleural effusions.     Liver: The liver demonstrates nodular contour and coarse echotexture, measuring 16.1 cm in craniocaudal dimension. No focal hepatic observation. No intrahepatic biliary dilatation. The main portal vein is patent with antegrade flow.     US visualization score: A - No or minimal limitations     Gallbladder: Gallstones. Gallbladder wall measures 0.2 cm. Nonhyperemic wall.     Bile Ducts: Normal caliber intra and extrahepatic biliary tree. The common bile duct measures 4 mm in diameter.     Pancreas: Visualized portions of the pancreas are unremarkable.      Kidney: The right kidney measures 9.2 cm in long dimension. No hydronephrosis, hydroureter, shadowing renal calculi, or solid mass. The capsule and parenchyma demonstrate normal echogenicity.     Aorta and IVC: The visualized portions of the aorta and IVC are unremarkable.                                                                     IMPRESSION:   1. Cirrhotic morphology without focal liver  observation.  a. LI-RADS US Category: US-1 Negative: No US evidence of HCC  b. Recommend continued surveillance US.  2. Cholelithiasis. No acute cholecystitis.    Assessment/Plan:   IMPRESSION:   Ms. Starks has well-compensated cirrhosis caused by chronic hepatitis C.  She is doing well.  She is up to date with regard to cancer and variceal screening.  She is declining additional COVID-19, RSV or flu vaccines.  She is up to date with regard to pneumococcal vaccination.  She did have recent variceal screening, which looked OK.     Otherwise, I will not be making any other change in her medical regimen.  I will see her back in the clinic in 6 months for repeat imaging and blood work.     I did spend a total of 30 minutes (on the date of the encounter), including 20 minutes of face-to-face clinic time including counseling. The rest of the time was spent in documentation and review of records.    Thank you very much for allowing me to participate in the care of this patient.  If you have any questions regarding my recommendations, please do not hesitate to contact me.      Sincerely,       Abbe Reynolds MD      Professor of Medicine  [employer]      Executive Medical Director, Solid Organ Transplant Program  [facility]

## 2025-01-07 ENCOUNTER — LAB (OUTPATIENT)
Dept: LAB | Facility: CLINIC | Age: 75
End: 2025-01-07
Attending: INTERNAL MEDICINE
Payer: MEDICARE

## 2025-01-07 ENCOUNTER — ANCILLARY PROCEDURE (OUTPATIENT)
Dept: ULTRASOUND IMAGING | Facility: CLINIC | Age: 75
End: 2025-01-07
Attending: INTERNAL MEDICINE
Payer: MEDICARE

## 2025-01-07 ENCOUNTER — OFFICE VISIT (OUTPATIENT)
Dept: GASTROENTEROLOGY | Facility: CLINIC | Age: 75
End: 2025-01-07
Attending: INTERNAL MEDICINE
Payer: MEDICARE

## 2025-01-07 VITALS
SYSTOLIC BLOOD PRESSURE: 150 MMHG | DIASTOLIC BLOOD PRESSURE: 79 MMHG | WEIGHT: 163.7 LBS | HEIGHT: 61 IN | OXYGEN SATURATION: 96 % | HEART RATE: 73 BPM | TEMPERATURE: 97.9 F | BODY MASS INDEX: 30.91 KG/M2

## 2025-01-07 DIAGNOSIS — E13.9 OTHER SPECIFIED DIABETES MELLITUS WITHOUT COMPLICATIONS (H): ICD-10-CM

## 2025-01-07 DIAGNOSIS — K74.60 CIRRHOSIS OF LIVER WITHOUT ASCITES, UNSPECIFIED HEPATIC CIRRHOSIS TYPE (H): ICD-10-CM

## 2025-01-07 DIAGNOSIS — E08.9 DIABETES MELLITUS DUE TO UNDERLYING CONDITION WITHOUT COMPLICATION, WITHOUT LONG-TERM CURRENT USE OF INSULIN (H): Primary | ICD-10-CM

## 2025-01-07 LAB
AFP SERPL-MCNC: 2.4 NG/ML
ALBUMIN SERPL BCG-MCNC: 3.8 G/DL (ref 3.5–5.2)
ALP SERPL-CCNC: 87 U/L (ref 40–150)
ALT SERPL W P-5'-P-CCNC: 53 U/L (ref 0–50)
ANION GAP SERPL CALCULATED.3IONS-SCNC: 9 MMOL/L (ref 7–15)
AST SERPL W P-5'-P-CCNC: 43 U/L (ref 0–45)
BILIRUB DIRECT SERPL-MCNC: 0.31 MG/DL (ref 0–0.3)
BILIRUB SERPL-MCNC: 0.9 MG/DL
BUN SERPL-MCNC: 14.7 MG/DL (ref 8–23)
CALCIUM SERPL-MCNC: 8.9 MG/DL (ref 8.8–10.4)
CHLORIDE SERPL-SCNC: 106 MMOL/L (ref 98–107)
CREAT SERPL-MCNC: 0.65 MG/DL (ref 0.51–0.95)
EGFRCR SERPLBLD CKD-EPI 2021: >90 ML/MIN/1.73M2
ERYTHROCYTE [DISTWIDTH] IN BLOOD BY AUTOMATED COUNT: 13.2 % (ref 10–15)
GLUCOSE SERPL-MCNC: 202 MG/DL (ref 70–99)
HCO3 SERPL-SCNC: 25 MMOL/L (ref 22–29)
HCT VFR BLD AUTO: 39.4 % (ref 35–47)
HGB BLD-MCNC: 13.2 G/DL (ref 11.7–15.7)
INR PPP: 1.08 (ref 0.85–1.15)
MCH RBC QN AUTO: 29 PG (ref 26.5–33)
MCHC RBC AUTO-ENTMCNC: 33.5 G/DL (ref 31.5–36.5)
MCV RBC AUTO: 87 FL (ref 78–100)
PLATELET # BLD AUTO: 83 10E3/UL (ref 150–450)
POTASSIUM SERPL-SCNC: 4 MMOL/L (ref 3.4–5.3)
PROT SERPL-MCNC: 6.2 G/DL (ref 6.4–8.3)
RBC # BLD AUTO: 4.55 10E6/UL (ref 3.8–5.2)
SODIUM SERPL-SCNC: 140 MMOL/L (ref 135–145)
WBC # BLD AUTO: 3.4 10E3/UL (ref 4–11)

## 2025-01-07 PROCEDURE — 99000 SPECIMEN HANDLING OFFICE-LAB: CPT | Performed by: PATHOLOGY

## 2025-01-07 PROCEDURE — 76705 ECHO EXAM OF ABDOMEN: CPT | Mod: GC | Performed by: RADIOLOGY

## 2025-01-07 PROCEDURE — 85027 COMPLETE CBC AUTOMATED: CPT | Performed by: PATHOLOGY

## 2025-01-07 PROCEDURE — G0463 HOSPITAL OUTPT CLINIC VISIT: HCPCS | Performed by: INTERNAL MEDICINE

## 2025-01-07 PROCEDURE — 36415 COLL VENOUS BLD VENIPUNCTURE: CPT | Performed by: PATHOLOGY

## 2025-01-07 PROCEDURE — 85610 PROTHROMBIN TIME: CPT | Performed by: PATHOLOGY

## 2025-01-07 PROCEDURE — 82105 ALPHA-FETOPROTEIN SERUM: CPT | Performed by: INTERNAL MEDICINE

## 2025-01-07 PROCEDURE — 82248 BILIRUBIN DIRECT: CPT | Performed by: PATHOLOGY

## 2025-01-07 PROCEDURE — 80053 COMPREHEN METABOLIC PANEL: CPT | Performed by: PATHOLOGY

## 2025-01-07 ASSESSMENT — PAIN SCALES - GENERAL: PAINLEVEL_OUTOF10: WORST PAIN (10)

## 2025-01-07 NOTE — PROGRESS NOTES
Hepatology Follow-Up Visit:     HISTORY OF PRESENT ILLNESS:   I had the pleasure of seeing Felisa Starks for followup in the Liver Clinic at the [facility] on January 7, 2025. She returns for follow-up of cirrhosis and is a sustained virologic responder to hepatitis C treatment.     She is doing well at this visit.  She denies any abdominal pain.  She denies any itching or skin rash.  She reports her energy level is good.  She denies any increased abdominal girth.  She does have some mild lower extremity edema.  She did injure her left ankle and does note more edema there.  She reports that they think she has an infection in that ankle but is not currently on any antibiotics.     She has not had any gastrointestinal bleeding or any overt signs of hepatic encephalopathy.     She denies any fevers or chills, cough or shortness of breath.  She denies any nausea or vomiting, diarrhea or constipation.  Her appetite is good.  Her weight is stable.  She does carry a diagnosis of diabetes mellitus but is on no medication.  She is trying to lose some weight.    Medications:   Current Outpatient Medications   Medication Sig Dispense Refill    albuterol (PROAIR HFA/PROVENTIL HFA/VENTOLIN HFA) 108 (90 Base) MCG/ACT inhaler Inhale 2 puffs into the lungs every 6 hours as needed for shortness of breath / dyspnea or wheezing (Patient not taking: Reported on 7/9/2024)      Ascorbic Acid (VITAMIN C) 500 MG CHEW Take 1 tablet by mouth as needed (takes when she feels she needs it)      budesonide-formoterol (SYMBICORT) 160-4.5 MCG/ACT Inhaler Symbicort 160 mcg-4.5 mcg/actuation HFA aerosol inhaler (Patient not taking: Reported on 1/4/2024)      Calcium Carb-Cholecalciferol (CALCIUM 500 + D3 PO) Take 1 tablet by mouth daily as needed (takes when she feels she needs it.)      fluticasone (FLONASE) 50 MCG/ACT nasal spray  (Patient not taking: Reported on 1/4/2024)      fluticasone (FLOVENT HFA) 220 MCG/ACT  "inhaler Inhale 2 puffs into the lungs 2 times daily (Patient not taking: Reported on 1/4/2024)      ibuprofen (ADVIL/MOTRIN) 200 MG tablet Take 200 mg by mouth every 4 hours as needed for mild pain      loperamide (IMODIUM A-D) 2 MG tablet Take 2 mg by mouth 4 times daily as needed for diarrhea      Multiple Vitamins-Minerals (MULTIVITAMIN ADULT PO) Take 1 capsule by mouth as needed      omeprazole (PRILOSEC) 20 MG DR capsule Take 20 mg by mouth       No current facility-administered medications for this visit.      Vitals:   BP (!) 150/79   Pulse 73   Temp 97.9  F (36.6  C) (Oral)   Ht 1.549 m (5' 1\")   Wt 74.3 kg (163 lb 11.2 oz)   SpO2 96%   BMI 30.93 kg/m      Physical Exam:   In general, she looks quite well. HEENT exam shows no scleral icterus or temporal muscle wasting. Chest is clear. Abdominal exam shows no increase in girth. No masses or tenderness to palpation are present. Liver is 10 cm in span without left lobe enlargement. No spleen tip is palpable. Extremity exam shows no edema. Skin exam shows no stigmata of chronic liver disease. Neurologic exam shows no asterixis.     Labs:   Lab Results   Component Value Date     01/07/2025    POTASSIUM 4.0 01/07/2025    CHLORIDE 106 01/07/2025    ANIONGAP 9 01/07/2025    CO2 25 01/07/2025    BUN 14.7 01/07/2025    CR 0.65 01/07/2025    GFRESTIMATED >90 01/07/2025    SANFORD 8.9 01/07/2025      Lab Results   Component Value Date    WBC 3.4 (L) 01/07/2025    HGB 13.2 01/07/2025    HCT 39.4 01/07/2025    MCV 87 01/07/2025    MCH 29.0 01/07/2025    MCHC 33.5 01/07/2025    RDW 13.2 01/07/2025    PLT 83 (L) 01/07/2025     Lab Results   Component Value Date    ALBUMIN 3.8 01/07/2025    ALKPHOS 87 01/07/2025    AST 43 01/07/2025    BILICONJ 0.0 01/15/2014     Lab Results   Component Value Date    INR 1.08 01/07/2025     MELD 3.0: 8 at 1/7/2025  6:22 AM  MELD-Na: 7 at 1/7/2025  6:22 AM  Calculated from:  Serum Creatinine: 0.65 mg/dL (Using min of 1 mg/dL) at " 1/7/2025  6:22 AM  Serum Sodium: 140 mmol/L (Using max of 137 mmol/L) at 1/7/2025  6:22 AM  Total Bilirubin: 0.9 mg/dL (Using min of 1 mg/dL) at 1/7/2025  6:22 AM  Serum Albumin: 3.8 g/dL (Using max of 3.5 g/dL) at 1/7/2025  6:22 AM  INR(ratio): 1.08 at 1/7/2025  6:22 AM  Age at listing (hypothetical): 74 years  Sex: Female at 1/7/2025  6:22 AM    Imaging:   EXAMINATION: US ABDOMEN LIMITED 1/7/2025 7:10 AM      INDICATION: Patient at high risk for HCC. HCC screen; Cirrhosis of liver without ascites     COMPARISON: 7/9/2024 abdominal ultrasound     TECHNIQUE: The abdomen right upper quadrant was scanned in the standard fashion with specialized ultrasound transducer(s) using both gray scale and limited color/spectral Doppler techniques.     FINDINGS:      Fluid: No evidence of ascites or pleural effusions.     Liver: Coarsened hepatic echotexture, measuring 15.5 cm in craniocaudal dimension. No focal hepatic mass. No intrahepatic biliary dilatation. The main portal vein is patent with antegrade flow.     US visualization score: B - Moderate limitations     Gallbladder: The gallbladder is well distended and of normal morphology. No wall thickening, pericholecystic fluid, sonographic Koch's sign. Cholelithiasis.     Bile Ducts: Normal caliber intra and extrahepatic biliary tree. The common bile duct measures 4 mm in diameter.     Pancreas: Visualized portions of the pancreas are unremarkable.     Kidney: The right kidney measures 9.4 cm in long dimension. No hydronephrosis, hydroureter, shadowing renal calculi, or solid mass. The capsule and parenchyma demonstrate normal echogenicity.     Aorta and IVC: The visualized portions of the aorta and IVC are unremarkable.                                                                    IMPRESSION:   1. Sonographic evidence of cirrhosis without focal liver lesion.  a. LI-RADS US Category: US-1 Negative: No US evidence of HCC.  b. Recommend continued surveillance US.  2.  Cholelithiasis.    Assessment/Plan:   IMPRESSION:   Ms. Starks has well-compensated cirrhosis caused by chronic hepatitis C.  She is doing well.  She is up to date with regard to cancer and variceal screening.  She is declining additional COVID-19, RSV or flu vaccines.  She is up to date with regard to pneumococcal vaccination.  She did have recent variceal screening, which looked OK.     Otherwise, I will not be making any other change in her medical regimen.  I will see her back in the clinic in 6 months for repeat imaging and blood work.     I did spend a total of 30 minutes (on the date of the encounter), including 20 minutes of face-to-face clinic time including counseling. The rest of the time was spent in documentation and review of records.    Thank you very much for allowing me to participate in the care of this patient.  If you have any questions regarding my recommendations, please do not hesitate to contact me.      Sincerely,       Abbe Reynolds MD      Professor of Medicine  [employer]      Executive Medical Director, Solid Organ Transplant Program  [facility]

## 2025-01-07 NOTE — NURSING NOTE
"Chief Complaint   Patient presents with    RECHECK     Follow Up     BP (!) 150/79   Pulse 73   Temp 97.9  F (36.6  C) (Oral)   Ht 1.549 m (5' 1\")   Wt 74.3 kg (163 lb 11.2 oz)   SpO2 96%   BMI 30.93 kg/m    Bobbi Lu on 1/7/2025 at 8:45 AM    "

## 2025-01-07 NOTE — LETTER
1/7/2025      Felisa Starks  [address]  [place] [place] [id number]      Dear Colleague,    Thank you for referring your patient, Felisa Starks, to the [facility] [place]. Please see a copy of my visit note below.    Hepatology Follow-Up Visit:     HISTORY OF PRESENT ILLNESS:   I had the pleasure of seeing Felisa Starks for followup in the Liver Clinic at the [facility] on January 7, 2025. She returns for follow-up of cirrhosis and is a sustained virologic responder to hepatitis C treatment.     She is doing well at this visit.  She denies any abdominal pain.  She denies any itching or skin rash.  She reports her energy level is good.  She denies any increased abdominal girth.  She does have some mild lower extremity edema.  She did injure her left ankle and does note more edema there.  She reports that they think she has an infection in that ankle but is not currently on any antibiotics.     She has not had any gastrointestinal bleeding or any overt signs of hepatic encephalopathy.     She denies any fevers or chills, cough or shortness of breath.  She denies any nausea or vomiting, diarrhea or constipation.  Her appetite is good.  Her weight is stable.  She does carry a diagnosis of diabetes mellitus but is on no medication.  She is trying to lose some weight.    Medications:   Current Outpatient Medications   Medication Sig Dispense Refill     albuterol (PROAIR HFA/PROVENTIL HFA/VENTOLIN HFA) 108 (90 Base) MCG/ACT inhaler Inhale 2 puffs into the lungs every 6 hours as needed for shortness of breath / dyspnea or wheezing (Patient not taking: Reported on 7/9/2024)       Ascorbic Acid (VITAMIN C) 500 MG CHEW Take 1 tablet by mouth as needed (takes when she feels she needs it)       budesonide-formoterol (SYMBICORT) 160-4.5 MCG/ACT Inhaler Symbicort 160 mcg-4.5 mcg/actuation HFA aerosol inhaler (Patient not taking: Reported on 1/4/2024)       Calcium Carb-Cholecalciferol  "(CALCIUM 500 + D3 PO) Take 1 tablet by mouth daily as needed (takes when she feels she needs it.)       fluticasone (FLONASE) 50 MCG/ACT nasal spray  (Patient not taking: Reported on 1/4/2024)       fluticasone (FLOVENT HFA) 220 MCG/ACT inhaler Inhale 2 puffs into the lungs 2 times daily (Patient not taking: Reported on 1/4/2024)       ibuprofen (ADVIL/MOTRIN) 200 MG tablet Take 200 mg by mouth every 4 hours as needed for mild pain       loperamide (IMODIUM A-D) 2 MG tablet Take 2 mg by mouth 4 times daily as needed for diarrhea       Multiple Vitamins-Minerals (MULTIVITAMIN ADULT PO) Take 1 capsule by mouth as needed       omeprazole (PRILOSEC) 20 MG DR capsule Take 20 mg by mouth       No current facility-administered medications for this visit.      Vitals:   BP (!) 150/79   Pulse 73   Temp 97.9  F (36.6  C) (Oral)   Ht 1.549 m (5' 1\")   Wt 74.3 kg (163 lb 11.2 oz)   SpO2 96%   BMI 30.93 kg/m      Physical Exam:   In general, she looks quite well. HEENT exam shows no scleral icterus or temporal muscle wasting. Chest is clear. Abdominal exam shows no increase in girth. No masses or tenderness to palpation are present. Liver is 10 cm in span without left lobe enlargement. No spleen tip is palpable. Extremity exam shows no edema. Skin exam shows no stigmata of chronic liver disease. Neurologic exam shows no asterixis.     Labs:   Lab Results   Component Value Date     01/07/2025    POTASSIUM 4.0 01/07/2025    CHLORIDE 106 01/07/2025    ANIONGAP 9 01/07/2025    CO2 25 01/07/2025    BUN 14.7 01/07/2025    CR 0.65 01/07/2025    GFRESTIMATED >90 01/07/2025    SANFORD 8.9 01/07/2025      Lab Results   Component Value Date    WBC 3.4 (L) 01/07/2025    HGB 13.2 01/07/2025    HCT 39.4 01/07/2025    MCV 87 01/07/2025    MCH 29.0 01/07/2025    MCHC 33.5 01/07/2025    RDW 13.2 01/07/2025    PLT 83 (L) 01/07/2025     Lab Results   Component Value Date    ALBUMIN 3.8 01/07/2025    ALKPHOS 87 01/07/2025    AST 43 " 01/07/2025    BILICONJ 0.0 01/15/2014     Lab Results   Component Value Date    INR 1.08 01/07/2025     MELD 3.0: 8 at 1/7/2025  6:22 AM  MELD-Na: 7 at 1/7/2025  6:22 AM  Calculated from:  Serum Creatinine: 0.65 mg/dL (Using min of 1 mg/dL) at 1/7/2025  6:22 AM  Serum Sodium: 140 mmol/L (Using max of 137 mmol/L) at 1/7/2025  6:22 AM  Total Bilirubin: 0.9 mg/dL (Using min of 1 mg/dL) at 1/7/2025  6:22 AM  Serum Albumin: 3.8 g/dL (Using max of 3.5 g/dL) at 1/7/2025  6:22 AM  INR(ratio): 1.08 at 1/7/2025  6:22 AM  Age at listing (hypothetical): 74 years  Sex: Female at 1/7/2025  6:22 AM    Imaging:   EXAMINATION: US ABDOMEN LIMITED 1/7/2025 7:10 AM      INDICATION: Patient at high risk for HCC. HCC screen; Cirrhosis of liver without ascites     COMPARISON: 7/9/2024 abdominal ultrasound     TECHNIQUE: The abdomen right upper quadrant was scanned in the standard fashion with specialized ultrasound transducer(s) using both gray scale and limited color/spectral Doppler techniques.     FINDINGS:      Fluid: No evidence of ascites or pleural effusions.     Liver: Coarsened hepatic echotexture, measuring 15.5 cm in craniocaudal dimension. No focal hepatic mass. No intrahepatic biliary dilatation. The main portal vein is patent with antegrade flow.     US visualization score: B - Moderate limitations     Gallbladder: The gallbladder is well distended and of normal morphology. No wall thickening, pericholecystic fluid, sonographic Koch's sign. Cholelithiasis.     Bile Ducts: Normal caliber intra and extrahepatic biliary tree. The common bile duct measures 4 mm in diameter.     Pancreas: Visualized portions of the pancreas are unremarkable.     Kidney: The right kidney measures 9.4 cm in long dimension. No hydronephrosis, hydroureter, shadowing renal calculi, or solid mass. The capsule and parenchyma demonstrate normal echogenicity.     Aorta and IVC: The visualized portions of the aorta and IVC are unremarkable.                                                                     IMPRESSION:   1. Sonographic evidence of cirrhosis without focal liver lesion.  a. LI-RADS US Category: US-1 Negative: No US evidence of HCC.  b. Recommend continued surveillance US.  2. Cholelithiasis.    Assessment/Plan:   IMPRESSION:   Ms. Starks has well-compensated cirrhosis caused by chronic hepatitis C.  She is doing well.  She is up to date with regard to cancer and variceal screening.  She is declining additional COVID-19, RSV or flu vaccines.  She is up to date with regard to pneumococcal vaccination.  She did have recent variceal screening, which looked OK.     Otherwise, I will not be making any other change in her medical regimen.  I will see her back in the clinic in 6 months for repeat imaging and blood work.     I did spend a total of 30 minutes (on the date of the encounter), including 20 minutes of face-to-face clinic time including counseling. The rest of the time was spent in documentation and review of records.    Thank you very much for allowing me to participate in the care of this patient.  If you have any questions regarding my recommendations, please do not hesitate to contact me.      Sincerely,       Abbe Reynolds MD      Professor of Medicine  [employer]      Executive Medical Director, Solid Organ Transplant Program  [facility]        Again, thank you for allowing me to participate in the care of your patient.        Sincerely,        Abbe Reynolds MD    Electronically signed

## 2025-05-22 ENCOUNTER — TELEPHONE (OUTPATIENT)
Dept: GASTROENTEROLOGY | Facility: CLINIC | Age: 75
End: 2025-05-22
Payer: MEDICARE

## 2025-05-22 NOTE — TELEPHONE ENCOUNTER
Patient confirmed scheduled appointment:     Date: 8/15/25  Time: 8:00 am  Appointment Type: Return Liver  Provider: Dr. Abbe Reynolds  Location: [facility]  Testing/imaging: Lab & US  Additional Notes:

## 2025-08-15 ENCOUNTER — ANCILLARY PROCEDURE (OUTPATIENT)
Dept: ULTRASOUND IMAGING | Facility: CLINIC | Age: 75
End: 2025-08-15
Attending: INTERNAL MEDICINE
Payer: MEDICARE

## 2025-08-15 ENCOUNTER — OFFICE VISIT (OUTPATIENT)
Dept: GASTROENTEROLOGY | Facility: CLINIC | Age: 75
End: 2025-08-15
Attending: INTERNAL MEDICINE
Payer: MEDICARE

## 2025-08-15 VITALS
BODY MASS INDEX: 29.66 KG/M2 | WEIGHT: 157 LBS | HEART RATE: 75 BPM | OXYGEN SATURATION: 97 % | TEMPERATURE: 97.8 F | DIASTOLIC BLOOD PRESSURE: 80 MMHG | SYSTOLIC BLOOD PRESSURE: 130 MMHG

## 2025-08-15 DIAGNOSIS — K74.60 CIRRHOSIS OF LIVER WITHOUT ASCITES, UNSPECIFIED HEPATIC CIRRHOSIS TYPE (H): ICD-10-CM

## 2025-08-15 DIAGNOSIS — K74.60 CIRRHOSIS OF LIVER WITHOUT ASCITES, UNSPECIFIED HEPATIC CIRRHOSIS TYPE (H): Primary | ICD-10-CM

## 2025-08-15 PROCEDURE — 99214 OFFICE O/P EST MOD 30 MIN: CPT | Mod: GC | Performed by: INTERNAL MEDICINE

## 2025-08-15 PROCEDURE — 1125F AMNT PAIN NOTED PAIN PRSNT: CPT | Performed by: INTERNAL MEDICINE

## 2025-08-15 PROCEDURE — 83036 HEMOGLOBIN GLYCOSYLATED A1C: CPT | Performed by: INTERNAL MEDICINE

## 2025-08-15 PROCEDURE — 82105 ALPHA-FETOPROTEIN SERUM: CPT | Performed by: INTERNAL MEDICINE

## 2025-08-15 PROCEDURE — 99000 SPECIMEN HANDLING OFFICE-LAB: CPT | Performed by: PATHOLOGY

## 2025-08-15 PROCEDURE — G0463 HOSPITAL OUTPT CLINIC VISIT: HCPCS | Performed by: INTERNAL MEDICINE

## 2025-08-15 PROCEDURE — 76705 ECHO EXAM OF ABDOMEN: CPT | Performed by: RADIOLOGY

## 2025-08-15 PROCEDURE — 3075F SYST BP GE 130 - 139MM HG: CPT | Performed by: INTERNAL MEDICINE

## 2025-08-15 PROCEDURE — 3079F DIAST BP 80-89 MM HG: CPT | Performed by: INTERNAL MEDICINE

## 2025-08-15 RX ORDER — NAPROXEN SODIUM 220 MG/1
220 TABLET, FILM COATED ORAL 2 TIMES DAILY WITH MEALS
COMMUNITY

## 2025-08-15 RX ORDER — OMEPRAZOLE 20 MG/1
20 CAPSULE, DELAYED RELEASE ORAL DAILY
Qty: 90 CAPSULE | Refills: 3 | Status: SHIPPED | OUTPATIENT
Start: 2025-08-15

## 2025-08-15 RX ORDER — CICLOPIROX 80 MG/ML
SOLUTION TOPICAL
COMMUNITY
Start: 2025-04-22

## 2025-08-15 ASSESSMENT — PAIN SCALES - GENERAL: PAINLEVEL_OUTOF10: MODERATE PAIN (4)
